# Patient Record
Sex: FEMALE | Race: WHITE | NOT HISPANIC OR LATINO | Employment: OTHER | URBAN - METROPOLITAN AREA
[De-identification: names, ages, dates, MRNs, and addresses within clinical notes are randomized per-mention and may not be internally consistent; named-entity substitution may affect disease eponyms.]

---

## 2017-01-05 DIAGNOSIS — I50.9 CONGESTIVE HEART FAILURE, UNSPECIFIED: ICD-10-CM

## 2017-01-06 RX ORDER — TORSEMIDE 20 MG/1
20 TABLET ORAL 2 TIMES DAILY
Qty: 60 TAB | Refills: 0 | Status: ON HOLD | OUTPATIENT
Start: 2017-01-06 | End: 2020-05-31

## 2017-01-19 ENCOUNTER — TELEPHONE (OUTPATIENT)
Dept: VASCULAR LAB | Facility: MEDICAL CENTER | Age: 82
End: 2017-01-19

## 2017-01-31 ENCOUNTER — TELEPHONE (OUTPATIENT)
Dept: VASCULAR LAB | Facility: MEDICAL CENTER | Age: 82
End: 2017-01-31

## 2017-01-31 ENCOUNTER — ANTICOAGULATION MONITORING (OUTPATIENT)
Dept: VASCULAR LAB | Facility: MEDICAL CENTER | Age: 82
End: 2017-01-31

## 2017-01-31 DIAGNOSIS — I48.91 ATRIAL FIBRILLATION, UNSPECIFIED TYPE (HCC): ICD-10-CM

## 2017-01-31 LAB — INR PPP: 1.9 (ref 2–3.5)

## 2017-02-01 NOTE — PROGRESS NOTES
Anticoagulation Summary as of 1/31/2017     INR goal 2.0-3.0   Selected INR 1.9! (1/31/2017)   Maintenance plan 1.25 mg (2.5 mg x 0.5) on Wed; 2.5 mg (2.5 mg x 1) all other days   Weekly total 16.25 mg   Plan last modified Jayson Sheth PHARMD (12/21/2016)   Next INR check 2/14/2017   Target end date Indefinite    Indications   Atrial fibrillation (CMS-HCC) [I48.91]         Anticoagulation Episode Summary     INR check location     Preferred lab     Send INR reminders to     Comments Contact her daughter Marilynn at [phone]  [facility]      Anticoagulation Care Providers     Provider Role Specialty Phone number    Ely Franks M.D. Referring Cardiology [phone]    Jayson Sheth PHARMD Responsible      [facility] Responsible  [phone]        Anticoagulation Patient Findings    Spoke with patient today regarding subtherapeutic INR of 1.9.  Patient denies any signs/symptoms of bruising or bleeding or any changes in diet and medications.  Instructed patient to call clinic with any questions or concerns.  Instructed patient to bolus with 3.75mg X 1, then resume current warfarin regimen.  Follow up in 2 weeks.    Jayson Sheth PHARMD

## 2017-02-14 LAB — INR PPP: 1.9 (ref 2–3.5)

## 2017-02-15 ENCOUNTER — ANTICOAGULATION MONITORING (OUTPATIENT)
Dept: VASCULAR LAB | Facility: MEDICAL CENTER | Age: 82
End: 2017-02-15

## 2017-02-15 DIAGNOSIS — I48.91 ATRIAL FIBRILLATION, UNSPECIFIED TYPE (HCC): ICD-10-CM

## 2017-02-16 NOTE — PROGRESS NOTES
Anticoagulation Summary as of 2/15/2017     INR goal 2.0-3.0   Selected INR 1.9! (2/14/2017)   Maintenance plan 2.5 mg (2.5 mg x 1) every day   Weekly total 17.5 mg   Plan last modified Jayson Sheth PHARMD (2/15/2017)   Next INR check 2/28/2017   Target end date Indefinite    Indications   Atrial fibrillation (CMS-HCC) [I48.91]         Anticoagulation Episode Summary     INR check location     Preferred lab     Send INR reminders to     Comments Contact her daughter Marilynn at [phone]  [facility]      Anticoagulation Care Providers     Provider Role Specialty Phone number    Ely Franks M.D. Referring Cardiology [phone]    Jayson Sheth PHARMD Responsible      [facility] Responsible  [phone]        Anticoagulation Patient Findings    Spoke with patients daughter Marilynn today regarding subtherapeutic INR of 1.9.  Patient denies any signs/symptoms of bruising or bleeding or any changes in diet and medications.  Instructed patient to call clinic with any questions or concerns.  Instructed patient to increase weekly warfarin regimen by ~8% as detailed above.  Follow up in 2 weeks.    Jayson Sheth, MALINAD

## 2017-03-15 ENCOUNTER — TELEPHONE (OUTPATIENT)
Dept: VASCULAR LAB | Facility: MEDICAL CENTER | Age: 82
End: 2017-03-15

## 2017-03-17 ENCOUNTER — ANTICOAGULATION MONITORING (OUTPATIENT)
Dept: VASCULAR LAB | Facility: MEDICAL CENTER | Age: 82
End: 2017-03-17

## 2017-03-17 LAB — INR PPP: 2.2 (ref 2–3.5)

## 2017-03-17 NOTE — PROGRESS NOTES
Anticoagulation Summary as of 3/17/2017     INR goal 2.0-3.0   Selected INR 2.2 (3/16/2017)   Maintenance plan 2.5 mg (2.5 mg x 1) every day   Weekly total 17.5 mg   Plan last modified Jayson Sheth, NAHUM (2/15/2017)   Next INR check 4/6/2017   Target end date Indefinite    Indications   Atrial fibrillation (CMS-HCC) [I48.91]         Anticoagulation Episode Summary     INR check location     Preferred lab     Send INR reminders to     Comments Contact her daughter Marilynn at [phone]  [facility]      Anticoagulation Care Providers     Provider Role Specialty Phone number    Laurierahel PREETI Franks Referring Cardiology [phone]    Jayson Sheth PHARMD Responsible      [facility] Anticoagulation Services Responsible  [phone]        Anticoagulation Patient Findings    Left voicemail message to report a therapeutic INR of 2.2.  Pt to continue with current warfarin dosing regimen. Requested pt contact the clinic for any s/s of unusual bleeding, bruising, clotting or any changes to diet or medication. FU 3 weeks.    César Schaeffer, PHARMD

## 2017-04-24 ENCOUNTER — TELEPHONE (OUTPATIENT)
Dept: VASCULAR LAB | Facility: MEDICAL CENTER | Age: 82
End: 2017-04-24

## 2017-05-17 ENCOUNTER — TELEPHONE (OUTPATIENT)
Dept: VASCULAR LAB | Facility: MEDICAL CENTER | Age: 82
End: 2017-05-17

## 2017-05-23 LAB — INR PPP: 2.6 (ref 2–3.5)

## 2017-05-24 ENCOUNTER — ANTICOAGULATION MONITORING (OUTPATIENT)
Dept: VASCULAR LAB | Facility: MEDICAL CENTER | Age: 82
End: 2017-05-24

## 2017-05-24 DIAGNOSIS — I48.91 ATRIAL FIBRILLATION, UNSPECIFIED TYPE (HCC): ICD-10-CM

## 2017-05-24 NOTE — PROGRESS NOTES
Anticoagulation Summary as of 5/24/2017     INR goal 2.0-3.0   Selected INR 2.6 (5/23/2017)   Maintenance plan 2.5 mg (2.5 mg x 1) every day   Weekly total 17.5 mg   Plan last modified Serenity Deras PHARMD (5/24/2017)   Next INR check 7/18/2017   Target end date Indefinite    Indications   Atrial fibrillation (CMS-HCC) [I48.91]         Anticoagulation Episode Summary     INR check location     Preferred lab     Send INR reminders to     Comments Contact her daughter Marilynn at [phone]  [facility]      Anticoagulation Care Providers     Provider Role Specialty Phone number    Laurierahel PREETI Franks Referring Cardiology [phone]    Jayson Sheth PHARMD Responsible      [facility]ticoagulation [facility] Responsible  [phone]        Anticoagulation Patient Findings    Spoke with Jacqueline to report a therapeutic INR of 2.6.Pt is to continue with current warfarin dosing regimen.  Pt denies any unusual s/s of bleeding, bruising, clotting or any changes to diet or medications.  Follow up in 8 weeks.    Serenity Deras, MALINAD

## 2017-08-02 ENCOUNTER — TELEPHONE (OUTPATIENT)
Dept: VASCULAR LAB | Facility: MEDICAL CENTER | Age: 82
End: 2017-08-02

## 2017-08-08 DIAGNOSIS — I48.20 CHRONIC ATRIAL FIBRILLATION (HCC): ICD-10-CM

## 2017-08-16 ENCOUNTER — ANTICOAGULATION MONITORING (OUTPATIENT)
Dept: VASCULAR LAB | Facility: MEDICAL CENTER | Age: 82
End: 2017-08-16

## 2017-08-16 DIAGNOSIS — I48.91 ATRIAL FIBRILLATION, UNSPECIFIED TYPE (HCC): ICD-10-CM

## 2017-08-16 NOTE — PROGRESS NOTES
Left message for pt to have INR checked  Updated SO faxed to [facility] lab.  Jayson Sheth, PHARMD

## 2017-08-17 ENCOUNTER — ANTICOAGULATION MONITORING (OUTPATIENT)
Dept: VASCULAR LAB | Facility: MEDICAL CENTER | Age: 82
End: 2017-08-17

## 2017-08-17 DIAGNOSIS — I48.91 ATRIAL FIBRILLATION, UNSPECIFIED TYPE (HCC): ICD-10-CM

## 2017-08-17 LAB
INR PPP: 3.2 (ref 2–3.5)
INR PPP: 3.2 (ref 2–3.5)

## 2017-08-17 NOTE — PROGRESS NOTES
Anticoagulation Summary as of 8/17/2017     INR goal 2.0-3.0   Selected INR 3.2! (8/17/2017)   Maintenance plan 2.5 mg (2.5 mg x 1) every day   Weekly total 17.5 mg   Plan last modified Serenity Deras, PHARMD (5/24/2017)   Next INR check 9/14/2017   Target end date Indefinite    Indications   Atrial fibrillation (CMS-HCC) [I48.91]         Anticoagulation Episode Summary     INR check location     Preferred lab     Send INR reminders to     Comments Contact her daughter Marilynn at [phone]  [facility]      Anticoagulation Care Providers     Provider Role Specialty Phone number    Laurierahel PREETI Franks Referring Cardiology [phone]    Jayson Sheth, MALINAD Responsible      [facility] Anticoagulation Services Responsible  [phone]        Anticoagulation Patient Findings  Spoke with pt and her daughter to confirm the following:    Patient's INR was therapeutic.   Denies any unusual s/s of bleeding, bruising, clotting.  Denies any changes to:   Diet   Medications  Denies alcohol or cranberry use.   Confirmed dosing regimen.    Pt is to reduce today then continue with current warfarin dosing regimen.    Follow up in 4 week(s) per pt preference.    César Schaeffer, PHARMD

## 2017-09-27 ENCOUNTER — TELEPHONE (OUTPATIENT)
Dept: VASCULAR LAB | Facility: MEDICAL CENTER | Age: 82
End: 2017-09-27

## 2017-10-05 LAB — INR PPP: 3.7 (ref 2–3.5)

## 2017-10-06 ENCOUNTER — ANTICOAGULATION MONITORING (OUTPATIENT)
Dept: VASCULAR LAB | Facility: MEDICAL CENTER | Age: 82
End: 2017-10-06

## 2017-10-06 NOTE — PROGRESS NOTES
Anticoagulation Summary  As of 10/6/2017    INR goal:   2.0-3.0   TTR:   42.5 % (1.4 y)   Today's INR:   3.7! (10/5/2017)   Maintenance plan:   1.25 mg (2.5 mg x 0.5) on Mon; 2.5 mg (2.5 mg x 1) all other days   Weekly total:   16.25 mg   Plan last modified:   César Schaeffer PharmD (10/6/2017)   Next INR check:   10/20/2017   Target end date:   Indefinite    Indications    Atrial fibrillation (CMS-HCC) [I48.91]             Anticoagulation Episode Summary     INR check location:       Preferred lab:       Send INR reminders to:       Comments:   Contact her daughter Marilynn at [phone]  [facility]      Anticoagulation Care Providers     Provider Role Specialty Phone number    Ely Franks M.D. Referring Cardiology [phone]    Jayson Sheth PharmD Responsible      [facility] Responsible  [phone]        Anticoagulation Patient Findings      HPI:  Jacqueline Cameron, on anticoagulation therapy with warfarin for afib.  Changes to current medical/health status since last appt: None  Denies signs/symptoms of bleeding and/or thrombosis since the last appt.    Denies any interval changes to diet  Denies any interval changes to medications since last appt.   Denies any complications or cost restrictions with current therapy.     A/P   INR  SUPRA-therapeutic.   Reduce today and begin 7% reduced regimen.     Next INR in 2 week(s).    César Schaeffer, DorisD

## 2017-10-11 DIAGNOSIS — I48.91 ATRIAL FIBRILLATION, UNSPECIFIED TYPE (HCC): ICD-10-CM

## 2017-11-08 ENCOUNTER — TELEPHONE (OUTPATIENT)
Dept: VASCULAR LAB | Facility: MEDICAL CENTER | Age: 82
End: 2017-11-08

## 2017-11-09 ENCOUNTER — ANTICOAGULATION MONITORING (OUTPATIENT)
Dept: VASCULAR LAB | Facility: MEDICAL CENTER | Age: 82
End: 2017-11-09

## 2017-11-09 DIAGNOSIS — I48.91 ATRIAL FIBRILLATION, UNSPECIFIED TYPE (HCC): ICD-10-CM

## 2017-11-09 LAB — INR PPP: 2.6 (ref 2–3.5)

## 2017-11-09 NOTE — PROGRESS NOTES
Anticoagulation Summary  As of 11/9/2017    INR goal:   2.0-3.0   TTR:   42.1 % (1.5 y)   Today's INR:   2.6   Maintenance plan:   1.25 mg (2.5 mg x 0.5) on Mon; 2.5 mg (2.5 mg x 1) all other days   Weekly total:   16.25 mg   Plan last modified:   César Schaeffer PharmD (10/6/2017)   Next INR check:   12/6/2017   Target end date:   Indefinite    Indications    Atrial fibrillation (CMS-HCC) [I48.91]             Anticoagulation Episode Summary     INR check location:       Preferred lab:       Send INR reminders to:       Comments:   Contact her daughter Marilynn at [phone]  [facility]      Anticoagulation Care Providers     Provider Role Specialty Phone number    Ely Franks M.D. Referring Cardiology [phone]    Jayson Sheth PharmD Responsible      [facility] Anticoagulation Services Responsible  [phone]        Anticoagulation Patient Findings    See note by Shalini Douglass 04/18/2016    Serenity Deras PharmD

## 2017-12-20 ENCOUNTER — TELEPHONE (OUTPATIENT)
Dept: VASCULAR LAB | Facility: MEDICAL CENTER | Age: 82
End: 2017-12-20

## 2018-01-03 ENCOUNTER — ANTICOAGULATION MONITORING (OUTPATIENT)
Dept: VASCULAR LAB | Facility: MEDICAL CENTER | Age: 83
End: 2018-01-03

## 2018-01-03 DIAGNOSIS — I48.91 ATRIAL FIBRILLATION, UNSPECIFIED TYPE (HCC): ICD-10-CM

## 2018-01-03 LAB — INR PPP: 3.2 (ref 2–3.5)

## 2018-01-04 NOTE — PROGRESS NOTES
Anticoagulation Summary  As of 1/3/2018    INR goal:   2.0-3.0   TTR:   44.4 % (1.7 y)   Today's INR:   3.2!   Maintenance plan:   1.25 mg (2.5 mg x 0.5) on Mon; 2.5 mg (2.5 mg x 1) all other days   Weekly total:   16.25 mg   Plan last modified:   César Schaeffer, PharmD (10/6/2017)   Next INR check:   1/17/2018   Target end date:   Indefinite    Indications    Atrial fibrillation (CMS-HCC) [I48.91]             Anticoagulation Episode Summary     INR check location:       Preferred lab:       Send INR reminders to:       Comments:   Contact her daughter Marilynn at [phone]  [facility]      Anticoagulation Care Providers     Provider Role Specialty Phone number    Ely Franks M.D. Referring Cardiology [phone]    Jayson Sheth, PharmD Responsible      [facility] Responsible  [phone]        Anticoagulation Patient Findings      Spoke with pt's daughter.  INR is SUPRA therapeutic.   Pt has been having on going dental issues, so hasn't been able to eat much.  Pt denies any unusual s/s of bleeding, bruising, clotting or any changes to diet or medications. Denies any etoh, cranberries, supplements, or illness.   Pt verifies warfarin weekly dosing.       Will have pt start a 7% decreased weekly dose.     Repeat INR in 2 weeks.     Tanya Mares, PharmD

## 2018-01-13 DIAGNOSIS — I48.91 ATRIAL FIBRILLATION, UNSPECIFIED TYPE (HCC): ICD-10-CM

## 2018-01-13 LAB — EKG IMPRESSION: NORMAL

## 2018-02-08 ENCOUNTER — ANTICOAGULATION MONITORING (OUTPATIENT)
Dept: VASCULAR LAB | Facility: MEDICAL CENTER | Age: 83
End: 2018-02-08

## 2018-02-08 LAB — INR PPP: 9.84 (ref 2–3.5)

## 2018-02-08 NOTE — PROGRESS NOTES
Anticoagulation Summary  As of 2/8/2018    INR goal:   2.0-3.0   TTR:   41.9 % (1.8 y)   Today's INR:   9.84!   Maintenance plan:   1.25 mg (2.5 mg x 0.5) on Mon, Thu; 2.5 mg (2.5 mg x 1) all other days   Weekly total:   15 mg   Plan last modified:   Tanya Mares, PharmD (1/3/2018)   Next INR check:   2/12/2018   Target end date:   Indefinite    Indications    Atrial fibrillation (CMS-HCC) [I48.91]             Anticoagulation Episode Summary     INR check location:       Preferred lab:       Send INR reminders to:       Comments:   Contact her daughter Marilynn at [phone]  [facility]      Anticoagulation Care Providers     Provider Role Specialty Phone number    Ely Franks M.D. Referring Cardiology [phone]    Jayson Sheth, PharmD Responsible      [facility] Anticoagulation [facility] Responsible  [phone]        Anticoagulation Patient Findings    Left VM with pt and with her daughter instructing her to holds warfarin until 2/12/18.  Pt to have INR checked again on 2/12/18.  Instructed pt to seek medical help for any bleeding.    César Schaeffer, PharmD

## 2018-02-12 LAB
INR PPP: 2.5 (ref 2–3.5)
INR PPP: 2.5 (ref 2–3.5)

## 2018-02-13 ENCOUNTER — ANTICOAGULATION MONITORING (OUTPATIENT)
Dept: VASCULAR LAB | Facility: MEDICAL CENTER | Age: 83
End: 2018-02-13

## 2018-02-13 DIAGNOSIS — I48.91 ATRIAL FIBRILLATION, UNSPECIFIED TYPE (HCC): ICD-10-CM

## 2018-02-13 NOTE — PROGRESS NOTES
OP Telephone Anticoagulation Service Note    Date: 2/13/2018      Anticoagulation Summary  As of 2/13/2018    INR goal:   2.0-3.0   TTR:   41.7 % (1.8 y)   Today's INR:   2.5 (2/12/2018)   Maintenance plan:   1.25 mg (2.5 mg x 0.5) on Mon, Thu; 2.5 mg (2.5 mg x 1) all other days   Weekly total:   15 mg   Plan last modified:   Tanya Mares, PharmD (1/3/2018)   Next INR check:   2/19/2018   Target end date:   Indefinite    Indications    Atrial fibrillation (CMS-HCC) [I48.91]             Anticoagulation Episode Summary     INR check location:       Preferred lab:       Send INR reminders to:       Comments:   Contact her daughter Marilynn at [phone]  [facility]      Anticoagulation Care Providers     Provider Role Specialty Phone number    GeoBreeTrevinrahel PREETI Franks Referring Cardiology [phone]    Jasyon Sheth, PharmD Responsible      [facility] Anticoagulation Services Responsible  [phone]        Anticoagulation Patient Findings        Plan: INR is in range. Spoke with pts daughter on the phone. She report pt has been holding warfarin as instructed. She was on a course of diflucan and did not notify our clinic. She has been off diflucan for about 5 days now. Will have pt resume her usual warfarin dosing regimen. Follow up 1 week.     Mela Sunshine, DorisD

## 2018-03-07 ENCOUNTER — TELEPHONE (OUTPATIENT)
Dept: VASCULAR LAB | Facility: MEDICAL CENTER | Age: 83
End: 2018-03-07

## 2018-03-08 ENCOUNTER — ANTICOAGULATION MONITORING (OUTPATIENT)
Dept: VASCULAR LAB | Facility: MEDICAL CENTER | Age: 83
End: 2018-03-08

## 2018-03-08 LAB — INR PPP: 3.6 (ref 2–3.5)

## 2018-03-09 NOTE — PROGRESS NOTES
Anticoagulation Summary  As of 3/8/2018    INR goal:   2.0-3.0   TTR:   41.8 % (1.8 y)   Today's INR:   3.6!   Maintenance plan:   1.25 mg (2.5 mg x 0.5) on Mon, Wed, Fri; 2.5 mg (2.5 mg x 1) all other days   Weekly total:   13.75 mg   Plan last modified:   César Schaeffer PharmD (3/8/2018)   Next INR check:   3/22/2018   Target end date:   Indefinite    Indications    Atrial fibrillation (CMS-HCC) [I48.91]             Anticoagulation Episode Summary     INR check location:       Preferred lab:       Send INR reminders to:       Comments:   Contact her daughter Marilynn at [phone]  [facility]      Anticoagulation Care Providers     Provider Role Specialty Phone number    Ely Franks M.D. Referring Cardiology [phone]    Doris AmadorD Responsible      [facility] Anticoagulation Services Responsible  [phone]        Anticoagulation Patient Findings    Left voicemail message to report a SUPRA therapeutic INR of 3.6.  Pt to begin reduced warfarin dosing regimen. Requested pt contact the clinic for any s/s of unusual bleeding, bruising, clotting or any changes to diet or medication. FU 2 weeks.    César Schaeffer, DorisD

## 2018-04-04 ENCOUNTER — TELEPHONE (OUTPATIENT)
Dept: VASCULAR LAB | Facility: MEDICAL CENTER | Age: 83
End: 2018-04-04

## 2018-04-10 DIAGNOSIS — R07.9 CHEST PAIN, UNSPECIFIED TYPE: ICD-10-CM

## 2018-04-17 ENCOUNTER — ANTICOAGULATION MONITORING (OUTPATIENT)
Dept: VASCULAR LAB | Facility: MEDICAL CENTER | Age: 83
End: 2018-04-17

## 2018-04-17 LAB — INR PPP: 3.4 (ref 2–3.5)

## 2018-04-17 NOTE — PROGRESS NOTES
Anticoagulation Summary  As of 4/17/2018    INR goal:   2.0-3.0   TTR:   39.5 % (2 y)   Today's INR:   3.4!   Maintenance plan:   1.25 mg (2.5 mg x 0.5) on Mon, Wed, Fri; 2.5 mg (2.5 mg x 1) all other days   Weekly total:   13.75 mg   Plan last modified:   César Schaeffer, PharmD (3/8/2018)   Next INR check:   4/20/2018   Target end date:   Indefinite    Indications    Atrial fibrillation (CMS-HCC) [I48.91]             Anticoagulation Episode Summary     INR check location:       Preferred lab:       Send INR reminders to:       Comments:   Contact her daughter Marilynn at [phone]  [facility]      Anticoagulation Care Providers     Provider Role Specialty Phone number    Ely Franks M.D. Referring Cardiology [phone]    Jayson Sheth, PharmD Responsible      [facility] Responsible  [phone]        Anticoagulation Patient Findings    HPI:  Jacqueline Cameron, on anticoagulation therapy with warfarin for AF.  Pt recently hospitalized for cellulitis. Pt started Bactrim.   Denies signs/symptoms of bleeding and/or thrombosis since the last appt.    Denies any interval changes to diet  Denies any complications or cost restrictions with current therapy.     A/P   INR  SUPRA-therapeutic.   Begin reduced regimen given DDI.     Next INR in 3 days.     César Schaeffer, PharmD

## 2018-04-19 LAB — EKG IMPRESSION: NORMAL

## 2018-04-26 ENCOUNTER — ANTICOAGULATION MONITORING (OUTPATIENT)
Dept: VASCULAR LAB | Facility: MEDICAL CENTER | Age: 83
End: 2018-04-26

## 2018-04-26 DIAGNOSIS — I48.0 PAROXYSMAL ATRIAL FIBRILLATION (HCC): ICD-10-CM

## 2018-04-26 LAB — INR PPP: 2.2 (ref 2–3.5)

## 2018-04-27 NOTE — PROGRESS NOTES
Anticoagulation Summary  As of 4/26/2018    INR goal:   2.0-3.0   TTR:   39.8 % (2 y)   Today's INR:   2.2   Warfarin maintenance plan:   1.25 mg (2.5 mg x 0.5) on Mon, Wed, Fri; 2.5 mg (2.5 mg x 1) all other days   Weekly warfarin total:   13.75 mg   Plan last modified:   Sandro Vazquez PharmD (4/26/2018)   Next INR check:   5/10/2018   Target end date:   Indefinite    Indications    Atrial fibrillation (CMS-HCC) [I48.91]             Anticoagulation Episode Summary     INR check location:       Preferred lab:       Send INR reminders to:       Comments:   Contact her daughter Marilynn at [phone]  [facility]      Anticoagulation Care Providers     Provider Role Specialty Phone number    Ely Franks M.D. Referring Cardiology [phone]    Jayson Sheth PharmD Responsible      [facility] Responsible  [phone]        Anticoagulation Patient Findings    Spoke to patient on the phone.   INR therapeutic.   Denies signs/symptoms of bleeding and/or thrombosis.   Denies changes to diet or medications.   Follow up appointment in 2 week(s).    Continue weekly warfarin dose as noted      Sandro Vazquez, Shane

## 2018-05-01 ENCOUNTER — ANTICOAGULATION MONITORING (OUTPATIENT)
Dept: VASCULAR LAB | Facility: MEDICAL CENTER | Age: 83
End: 2018-05-01

## 2018-05-01 DIAGNOSIS — I48.91 ATRIAL FIBRILLATION, UNSPECIFIED TYPE (HCC): ICD-10-CM

## 2018-05-01 LAB — INR PPP: 1.7 (ref 2–3.5)

## 2018-05-01 NOTE — PROGRESS NOTES
Anticoagulation Summary  As of 5/1/2018    INR goal:   2.0-3.0   TTR:   39.8 % (2 y)   Today's INR:   1.7!   Warfarin maintenance plan:   1.25 mg (2.5 mg x 0.5) on Mon, Wed, Fri; 2.5 mg (2.5 mg x 1) all other days   Weekly warfarin total:   13.75 mg   Plan last modified:   Sandro Vazquez PharmD (4/26/2018)   Next INR check:   5/8/2018   Target end date:   Indefinite    Indications    Atrial fibrillation (HCC) [I48.91]             Anticoagulation Episode Summary     INR check location:       Preferred lab:       Send INR reminders to:       Comments:   Contact her daughter Marilynn at [phone]  [facility]      Anticoagulation Care Providers     Provider Role Specialty Phone number    Ely Franks M.D. Referring Cardiology [phone]    Doris AmadorD Responsible      [facility] Anticoagulation Services Responsible  [phone]        Anticoagulation Patient Findings    Spoke with Randee, to report a sub therapeutic INR of 1.7.  Will bolus dose with 3.75mg tonight, then resume current dosing regimen. Pt denies any unusual s/s of bleeding, bruising, clotting or any changes to diet or medications.  Follow up in 1 weeks, to reduce risk of adverse events related to this high risk medication,  Warfarin.    Serenity Deras, PharmD

## 2018-05-02 ENCOUNTER — ANTICOAGULATION MONITORING (OUTPATIENT)
Dept: VASCULAR LAB | Facility: MEDICAL CENTER | Age: 83
End: 2018-05-02

## 2018-05-02 DIAGNOSIS — I48.91 ATRIAL FIBRILLATION, UNSPECIFIED TYPE (HCC): ICD-10-CM

## 2018-05-10 ENCOUNTER — ANTICOAGULATION MONITORING (OUTPATIENT)
Dept: VASCULAR LAB | Facility: MEDICAL CENTER | Age: 83
End: 2018-05-10

## 2018-05-10 LAB — INR PPP: 2.1 (ref 2–3.5)

## 2018-05-10 NOTE — PROGRESS NOTES
Anticoagulation Summary  As of 5/10/2018    INR goal:   2.0-3.0   TTR:   39.6 % (2 y)   Today's INR:   2.1   Warfarin maintenance plan:   1.25 mg (2.5 mg x 0.5) on Mon, Wed, Fri; 2.5 mg (2.5 mg x 1) all other days   Weekly warfarin total:   13.75 mg   Plan last modified:   Sandro Vazquez, PharmD (4/26/2018)   Next INR check:   5/17/2018   Target end date:   Indefinite    Indications    Atrial fibrillation (HCC) [I48.91]             Anticoagulation Episode Summary     INR check location:       Preferred lab:       Send INR reminders to:       Comments:   Contact her daughter Marilynn at [phone]  [facility]  [place] - fax - [phone]      Anticoagulation Care Providers     Provider Role Specialty Phone number    GeoBreeTrevinrahel PREETI Franks Referring Cardiology [phone]    Jayson Sheth, PharmD Responsible      [facility] Anticoagulation Services Responsible  [phone]        Anticoagulation Patient Findings    Spoke with patient's daughter to report a therapeutic INR.    Pt instructed to continue with current warfarin dosing regimen, confirms dosing.   Pt denies any s/s of bleeding, bruising, clotting or any changes to diet or medication.    Will follow up in 1 week(s) with Burt MONIKA.     Tanya Mares, PharmD

## 2018-05-11 ENCOUNTER — ANTICOAGULATION MONITORING (OUTPATIENT)
Dept: VASCULAR LAB | Facility: MEDICAL CENTER | Age: 83
End: 2018-05-11

## 2018-05-17 ENCOUNTER — ANTICOAGULATION MONITORING (OUTPATIENT)
Dept: VASCULAR LAB | Facility: MEDICAL CENTER | Age: 83
End: 2018-05-17

## 2018-05-17 LAB — INR PPP: 2.3 (ref 2–3.5)

## 2018-05-17 NOTE — PROGRESS NOTES
Anticoagulation Summary  As of 5/17/2018    INR goal:   2.0-3.0   TTR:   40.2 % (2 y)   Today's INR:   2.3   Warfarin maintenance plan:   1.25 mg (2.5 mg x 0.5) on Mon, Wed, Fri; 2.5 mg (2.5 mg x 1) all other days   Weekly warfarin total:   13.75 mg   Plan last modified:   Sandro Vazquez, PharmD (4/26/2018)   Next INR check:   5/24/2018   Target end date:   Indefinite    Indications    Atrial fibrillation (HCC) [I48.91]             Anticoagulation Episode Summary     INR check location:       Preferred lab:       Send INR reminders to:       Comments:   Contact her daughter Marilynn at [phone]  [facility]  [place] - fax - [phone]      Anticoagulation Care Providers     Provider Role Specialty Phone number    MatthieuDugregoryrahel PREETI Franks Referring Cardiology [phone]    Jayson Sheth, PharmD Responsible      [facility] Anticoagulation Services Responsible  [phone]        Anticoagulation Patient Findings      Left voicemail message to report a therapeutic INR of 2.3.     Pt to continue with current warfarin dosing regimen. Requested pt contact the clinic for any s/s of unusual bleeding, bruising, clotting or any changes to diet or medication.    FU INR in 1 week(s) with Burt MONIKA.    Tanya Mares, PharmD

## 2018-05-29 ENCOUNTER — ANTICOAGULATION MONITORING (OUTPATIENT)
Dept: VASCULAR LAB | Facility: MEDICAL CENTER | Age: 83
End: 2018-05-29

## 2018-05-29 DIAGNOSIS — I48.91 ATRIAL FIBRILLATION, UNSPECIFIED TYPE (HCC): ICD-10-CM

## 2018-05-29 LAB — INR PPP: 1.6 (ref 2–3.5)

## 2018-05-29 NOTE — PROGRESS NOTES
OP Telephone Anticoagulation Service Note    Date: 5/29/2018      Anticoagulation Summary  As of 5/29/2018    INR goal:   2.0-3.0   TTR:   40.2 % (2.1 y)   Today's INR:   1.6!   Warfarin maintenance plan:   1.25 mg (2.5 mg x 0.5) on Mon, Wed, Fri; 2.5 mg (2.5 mg x 1) all other days   Weekly warfarin total:   13.75 mg   Plan last modified:   Doris MarceloD (4/26/2018)   Next INR check:   6/5/2018   Target end date:   Indefinite    Indications    Atrial fibrillation (HCC) [I48.91]             Anticoagulation Episode Summary     INR check location:       Preferred lab:       Send INR reminders to:       Comments:   Contact her daughter Marilynn at [phone]  [facility] - fax - [phone]      Anticoagulation Care Providers     Provider Role Specialty Phone number    MattiheuTrevinrahel PREETI Franks Referring Cardiology [phone]    Doris AmadorD Responsible      [facility] Anticoagulation Services Responsible  [phone]        Anticoagulation Patient Findings        Plan: INR subtherapeutic. Left message on patient's answering machine/voicemail. Instructed patient to call back with any concerns regarding any unusual bleeding or bruising, any medication or diet changes or any signs or symptoms of thrombosis. Instructed patient to take 1.5 tablets tonight then resume medication as outlined above. Patient to follow up in 1 week(s).       Mela Sunshine, DorisD

## 2018-06-18 ENCOUNTER — TELEPHONE (OUTPATIENT)
Dept: VASCULAR LAB | Facility: MEDICAL CENTER | Age: 83
End: 2018-06-18

## 2018-06-18 DIAGNOSIS — I48.91 ATRIAL FIBRILLATION, UNSPECIFIED TYPE (HCC): ICD-10-CM

## 2018-07-18 ENCOUNTER — TELEPHONE (OUTPATIENT)
Dept: VASCULAR LAB | Facility: MEDICAL CENTER | Age: 83
End: 2018-07-18

## 2018-07-18 DIAGNOSIS — I48.91 ATRIAL FIBRILLATION, UNSPECIFIED TYPE (HCC): ICD-10-CM

## 2018-07-18 NOTE — TELEPHONE ENCOUNTER
Received a fax from [facility] stating that the patient is no longer in service with them

## 2018-07-18 NOTE — TELEPHONE ENCOUNTER
Left message for patient's daughter to have INR checked, not sure if still being visited by [facility].  Jayson Sheth, PharmD

## 2018-07-19 ENCOUNTER — ANTICOAGULATION MONITORING (OUTPATIENT)
Dept: VASCULAR LAB | Facility: MEDICAL CENTER | Age: 83
End: 2018-07-19

## 2018-07-19 LAB — INR PPP: 2.5 (ref 2–3.5)

## 2018-07-19 NOTE — PROGRESS NOTES
OP Anticoagulation Service Note    Date: 7/19/2018  Anticoagulation Summary  As of 7/19/2018    INR goal:   2.0-3.0   TTR:   41.2 % (2.2 y)   Today's INR:   2.5   Warfarin maintenance plan:   1.25 mg (2.5 mg x 0.5) on Mon, Wed, Fri; 2.5 mg (2.5 mg x 1) all other days   Weekly warfarin total:   13.75 mg   No change documented:   Shine Caro Ass't   Plan last modified:   Sandro Vazquez PharmD (4/26/2018)   Next INR check:   8/16/2018   Target end date:   Indefinite    Indications    Atrial fibrillation (HCC) [I48.91]             Anticoagulation Episode Summary     INR check location:       Preferred lab:       Send INR reminders to:       Comments:   Contact her daughter Marilynn at [phone]  [facility] - fax - [phone]      Anticoagulation Care Providers     Provider Role Specialty Phone number    Ely Franks M.D. Referring Cardiology [phone]    Doris AmadorD Responsible      [facility] Anticoagulation Services Responsible  [phone]        Anticoagulation Patient Findings  Patient Findings     Negatives:   Signs/symptoms of thrombosis, Signs/symptoms of bleeding, Laboratory test error suspected, Change in health, Change in alcohol use, Change in activity, Upcoming invasive procedure, Emergency department visit, Upcoming dental procedure, Missed doses, Extra doses, Change in medications, Change in diet/appetite, Hospital admission, Bruising, Other complaints        Plan: unable to reach patient or LM. VM is full. Will call back at a later time.    Shine Caro. Ass't  [facility]    I have reviewed and concur with the above plan     Sandro Vazquez, DorisD

## 2018-08-28 ENCOUNTER — TELEPHONE (OUTPATIENT)
Dept: VASCULAR LAB | Facility: MEDICAL CENTER | Age: 83
End: 2018-08-28

## 2018-08-28 NOTE — TELEPHONE ENCOUNTER
Attempted to reach patient's daughter regarding need for INR, no answer, no VM.  Will try back at a later time.  Jayson Sheth, PharmD

## 2018-10-10 ENCOUNTER — ANTICOAGULATION MONITORING (OUTPATIENT)
Dept: VASCULAR LAB | Facility: MEDICAL CENTER | Age: 83
End: 2018-10-10

## 2018-10-10 DIAGNOSIS — I48.91 ATRIAL FIBRILLATION, UNSPECIFIED TYPE (HCC): ICD-10-CM

## 2018-10-10 LAB — INR PPP: 2.4 (ref 2–3.5)

## 2018-10-10 NOTE — PROGRESS NOTES
Anticoagulation Summary  As of 10/10/2018    INR goal:   2.0-3.0   TTR:   46.7 % (2.4 y)   Today's INR:   2.4   Warfarin maintenance plan:   1.25 mg (2.5 mg x 0.5) on Mon, Wed, Fri; 2.5 mg (2.5 mg x 1) all other days   Weekly warfarin total:   13.75 mg   Plan last modified:   Sandro Vazquez, PharmD (4/26/2018)   Next INR check:   11/28/2018   Target end date:   Indefinite    Indications    Atrial fibrillation (HCC) [I48.91]             Anticoagulation Episode Summary     INR check location:       Preferred lab:       Send INR reminders to:       Comments:   Contact her daughter Marilynn at [phone]  [facility] - fax - [phone]      Anticoagulation Care Providers     Provider Role Specialty Phone number    Ely Franks M.D. Referring Cardiology [phone]    Jayson Sheth, PharmD Responsible      [facility] Anticoagulation Services Responsible  [phone]        Anticoagulation Patient Findings    History of Present Illness: follow up appointment for chronic anticoagulation with the high risk medication, warfarin for atrial fibrillation    Pt remains therapeutic today. Continue current dosing regimen.  Follow up in 8 weeks, to reduce the risk of adverse events related to this high risk medication, warfarin.    Serenity Deras, Clinical Pharmacist

## 2018-12-10 ENCOUNTER — ANTICOAGULATION MONITORING (OUTPATIENT)
Dept: VASCULAR LAB | Facility: MEDICAL CENTER | Age: 83
End: 2018-12-10

## 2018-12-10 DIAGNOSIS — I48.91 ATRIAL FIBRILLATION, UNSPECIFIED TYPE (HCC): ICD-10-CM

## 2018-12-10 LAB — INR PPP: 2.93 (ref 2–3.5)

## 2018-12-11 NOTE — PROGRESS NOTES
Pt's daughter called, [employer] pharmacy student spoke with her regarding bruising on arm.   Advised to monitor it and if worsening or overly concerned to go get it assessed by MD.   INR therapeutic today.    Tanya Mares, PharmD

## 2018-12-11 NOTE — PROGRESS NOTES
OP Anticoagulation Service Note    Date: 12/10/2018  .  Anticoagulation Summary  As of 12/10/2018    INR goal:   2.0-3.0   TTR:   50.2 % (2.6 y)   Today's INR:   2.93   Warfarin maintenance plan:   1.25 mg (2.5 mg x 0.5) on Mon, Wed, Fri; 2.5 mg (2.5 mg x 1) all other days   Weekly warfarin total:   13.75 mg   No change documented:   Shine Caro Ass't   Plan last modified:   Sandro Vazquez, PharmD (4/26/2018)   Next INR check:   1/21/2019   Target end date:   Indefinite    Indications    Atrial fibrillation (HCC) [I48.91]             Anticoagulation Episode Summary     INR check location:       Preferred lab:       Send INR reminders to:       Comments:   Contact her daughter Marilynn at [phone]  [facility] - fax - [phone]      Anticoagulation Care Providers     Provider Role Specialty Phone number    Laurierahel PREETI Franks Referring Cardiology [phone]    Doris AmadorD Responsible      [facility] Anticoagulation Services Responsible  [phone]        Anticoagulation Patient Findings  Patient Findings     Negatives:   Signs/symptoms of thrombosis, Signs/symptoms of bleeding, Laboratory test error suspected, Change in health, Change in alcohol use, Change in activity, Upcoming invasive procedure, Emergency department visit, Upcoming dental procedure, Missed doses, Extra doses, Change in medications, Change in diet/appetite, Hospital admission, Bruising, Other complaints        Plan: Left patient a message. Patient is therapeutic and will remain on the same dose. Patient was instructed to call back if needed to report any unusual bleeding or bruising or any changes to medication or diet. Patient is to be checked again in 6 weeks.    Shine Caro. Ass't  [facility]    I have reviewed and concur with the above plan     Sandro Vazquez, PharmD    I faxed this note to home health as noted above.

## 2019-02-05 ENCOUNTER — TELEPHONE (OUTPATIENT)
Dept: VASCULAR LAB | Facility: MEDICAL CENTER | Age: 84
End: 2019-02-05

## 2019-02-21 ENCOUNTER — ANTICOAGULATION MONITORING (OUTPATIENT)
Dept: VASCULAR LAB | Facility: MEDICAL CENTER | Age: 84
End: 2019-02-21

## 2019-02-21 DIAGNOSIS — I48.91 ATRIAL FIBRILLATION, UNSPECIFIED TYPE (HCC): ICD-10-CM

## 2019-02-21 LAB — INR PPP: 1.92 (ref 2–3.5)

## 2019-02-21 NOTE — PROGRESS NOTES
Anticoagulation Summary  As of 2019    INR goal:   2.0-3.0   TTR:   53.2 % (2.8 y)   INR used for dosin.92! (2019)   Warfarin maintenance plan:   1.25 mg (2.5 mg x 0.5) every Mon, Wed, Fri; 2.5 mg (2.5 mg x 1) all other days   Weekly warfarin total:   13.75 mg   Plan last modified:   Doris MarceloD (2018)   Next INR check:   3/21/2019   Target end date:   Indefinite    Indications    Atrial fibrillation (HCC) [I48.91]             Anticoagulation Episode Summary     INR check location:       Preferred lab:       Send INR reminders to:       Comments:   Contact her daughter Marilynn at [phone]  [facility] - fax - [phone]      Anticoagulation Care Providers     Provider Role Specialty Phone number    Ely Franks M.D. Referring Cardiology [phone]    Jayson Sheth, Shane Responsible      [facility] Anticoagulation Services Responsible  [phone]        Anticoagulation Patient Findings    Left voicemail message to report a subtherapeutic INR of 1.92.  Requested pt contact the clinic for any s/s of unusual bleeding, bruising, clotting or any changes to diet or medication.   Instructed patient to bolus 3.75mg X 1, then resume current warfarin regimen.  FU 4 weeks.  Jayson Sheth, DorisD

## 2019-04-09 ENCOUNTER — TELEPHONE (OUTPATIENT)
Dept: VASCULAR LAB | Facility: MEDICAL CENTER | Age: 84
End: 2019-04-09

## 2019-06-04 ENCOUNTER — TELEPHONE (OUTPATIENT)
Dept: VASCULAR LAB | Facility: MEDICAL CENTER | Age: 84
End: 2019-06-04

## 2019-07-19 ENCOUNTER — TELEPHONE (OUTPATIENT)
Dept: VASCULAR LAB | Facility: MEDICAL CENTER | Age: 84
End: 2019-07-19

## 2019-07-19 NOTE — LETTER
July 19, 2019    Jacqueline Cameron  [address]  [place] [address]      Dear Jacqueline Cameron  We have been unsuccessful in our attempts to contact you regarding your Anticoagulation Service appointments.  Warfarin is a potent blood-thinning agent that requires frequent monitoring to measure its level in the blood.  If your level becomes too high, you could develop serious, sometimes life-threatening bleeding problems.  If your level becomes too low, life-threatening blood clots or stroke could result.     The last recorded INR that we have on file for you is:  INR   Date Value Ref Range Status   02/21/2019 1.92  Final     It is extremely important that you have your lab work drawn as soon as possible.  Alternatively, you may schedule an appointment for a fingerstick INR at our clinic.  We are open Monday-Friday 8 am until 5 pm.  You may reach our Service at [phone].     To monitor your warfarin level effectively, we need to be able to communicate with you.  This is a requirement to be followed by our Service.  If we are unable to contact you on three separate occasions, you will be discharged from the Anticoagulation Service.     If you repeatedly fail to keep your lab appointments, you are at risk of being discharged from the Service.           Sincerely,           Deep Verde, PharmD, [facility]  Pharmacy Clinical Supervisor  [facility]  Outpatient Anticoagulation Service

## 2019-07-29 ENCOUNTER — ANTICOAGULATION MONITORING (OUTPATIENT)
Dept: MEDICAL GROUP | Facility: MEDICAL CENTER | Age: 84
End: 2019-07-29

## 2019-07-29 DIAGNOSIS — I48.91 ATRIAL FIBRILLATION, UNSPECIFIED TYPE (HCC): ICD-10-CM

## 2019-07-29 LAB — INR PPP: 2.77 (ref 2–3.5)

## 2019-07-29 NOTE — PROGRESS NOTES
OP Telephone Anticoagulation Service Note    Date: 2019      Anticoagulation Summary  As of 2019    INR goal:   2.0-3.0   TTR:   58.2 % (3.2 y)   INR used for dosin.77 (2019)   Warfarin maintenance plan:   1.5 mg (3 mg x 0.5) every Mon, Wed, Fri; 3 mg (3 mg x 1) all other days   Weekly warfarin total:   16.5 mg   Plan last modified:   Franc Sampson, Pharmacy Intern (2019)   Next INR check:   2019   Target end date:   Indefinite    Indications    Atrial fibrillation (HCC) [I48.91]             Anticoagulation Episode Summary     INR check location:       Preferred lab:       Send INR reminders to:       Comments:   Contact her daughter Marilynn at [phone]  [facility] - fax - [phone]      Anticoagulation Care Providers     Provider Role Specialty Phone number    Ely PREETI Franks Referring Cardiology [phone]    Jayson Sheth, PharmD Responsible      [facility] Anticoagulation Services Responsible  [phone]        Anticoagulation Patient Findings        Plan: Spoke with patient's daughter Shawna on the phone. Patient is therapeutic today. On attempt to confirm dosing, patient's daughter states that patient was using 3 mg tablets instead of 2.5 mg tablets, but that dosing was otherwise unchanged concerning full vs. Half tablet days. Asked Shawna to confirm color of the tablets, she states they were a light-brown/taupe colored, patient chart was updated to reflect this.  No missed tablets in the last week. Patient denies any changes in medications or diet. Patient denies any signs or symptoms of bleeding or clotting. Instructed patient to call clinic if any unusual bleeding or bruising occurs. Will continue dosing as outlined. Will follow-up with patient in 4 week(s).      Franc Sampson, Pharmacy Intern

## 2019-09-17 ENCOUNTER — TELEPHONE (OUTPATIENT)
Dept: VASCULAR LAB | Facility: MEDICAL CENTER | Age: 84
End: 2019-09-17

## 2019-11-18 ENCOUNTER — TELEPHONE (OUTPATIENT)
Dept: VASCULAR LAB | Facility: MEDICAL CENTER | Age: 84
End: 2019-11-18

## 2019-12-11 ENCOUNTER — ANTICOAGULATION MONITORING (OUTPATIENT)
Dept: VASCULAR LAB | Facility: MEDICAL CENTER | Age: 84
End: 2019-12-11

## 2019-12-11 DIAGNOSIS — I48.91 ATRIAL FIBRILLATION, UNSPECIFIED TYPE (HCC): ICD-10-CM

## 2019-12-11 LAB — INR PPP: 4.4 (ref 2–3.5)

## 2019-12-11 NOTE — PROGRESS NOTES
Anticoagulation Summary  As of 2019    INR goal:   2.0-3.0   TTR:   53.7 % (3.6 y)   INR used for dosin.40! (2019)   Warfarin maintenance plan:   1.5 mg (3 mg x 0.5) every Mon, Wed, Fri; 3 mg (3 mg x 1) all other days   Weekly warfarin total:   16.5 mg   Plan last modified:   Franc Sampson, Pharmacy Intern (2019)   Next INR check:   2019   Target end date:   Indefinite    Indications    Atrial fibrillation (HCC) [I48.91]             Anticoagulation Episode Summary     INR check location:       Preferred lab:       Send INR reminders to:       Comments:   Contact her daughter Marilynn at [phone]  [facility] - fax - [phone]      Anticoagulation Care Providers     Provider Role Specialty Phone number    Ely Franks M.D. Referring Cardiology [phone]    Jayson Sheth, PharmD Responsible      [facility] Anticoagulation Services Responsible  [phone]        Anticoagulation Patient Findings     Left voicemail message to report a supratherapeutic INR of 4.4.  Requested pt contact the clinic for any s/s of unusual bleeding, bruising, clotting or any changes to diet or medication.   Instructed patient to HOLD X 1, decrease tomorrow to 1.5mg, then resume current warfarin regimen.  FU 5 days.  Jayson Sheth, PharmD, BCACP

## 2020-01-07 ENCOUNTER — TELEPHONE (OUTPATIENT)
Dept: VASCULAR LAB | Facility: MEDICAL CENTER | Age: 85
End: 2020-01-07

## 2020-01-21 ENCOUNTER — TELEPHONE (OUTPATIENT)
Dept: VASCULAR LAB | Facility: MEDICAL CENTER | Age: 85
End: 2020-01-21

## 2020-01-21 NOTE — LETTER
January 21, 2020          Jacqueline Cameron  [address]  [place] [address]        Dear Jacqueline Cameron ,    We have been unsuccessful in our attempts to contact you regarding your Anticoagulation Service appointments. Warfarin is a potent blood-thinning agent that requires monitoring to ensure that the dosage is correct for your body.  If it isn't, you could develop serious, sometimes life-threatening bleeding problems or life-threatening blood clots or stroke could result.     It is extremely important that you have your lab work drawn as soon as possible.  We are open Monday-Friday 8 am until 5 pm.  You may reach our Service at [phone].     To monitor you effectively, we need to be able to communicate with you.  This is a requirement to be followed by our Service.  If we are unable to contact you on three separate occasions, you will be discharged from the Anticoagulation Service.     If you repeatedly fail to keep your lab appointments, you are at risk of being discharged from the Service.           Sincerely,           Deep Verde, PharmD, BCPS  Pharmacy Clinical Supervisor  [facility]  Outpatient Anticoagulation Service

## 2020-01-21 NOTE — TELEPHONE ENCOUNTER
Left message for pt to have INR checked  2nd call  Letter sent   Serenity Deras, Clinical Pharmacist, CDE, CACP

## 2020-02-12 ENCOUNTER — TELEPHONE (OUTPATIENT)
Dept: VASCULAR LAB | Facility: MEDICAL CENTER | Age: 85
End: 2020-02-12

## 2020-03-06 ENCOUNTER — TELEPHONE (OUTPATIENT)
Dept: VASCULAR LAB | Facility: MEDICAL CENTER | Age: 85
End: 2020-03-06

## 2020-03-06 ENCOUNTER — ANTICOAGULATION MONITORING (OUTPATIENT)
Dept: VASCULAR LAB | Facility: MEDICAL CENTER | Age: 85
End: 2020-03-06

## 2020-03-06 NOTE — LETTER
March 6, 2020          Jacqueline Cameron  [address]  [place] [address]        Dear Jacqueline Cameron ,    We have been unsuccessful in our attempts to contact you regarding your Anticoagulation Service appointments. Warfarin is a potent blood-thinning agent that requires monitoring to ensure that the dosage is correct for your body.  If it isn't, you could develop serious, sometimes life-threatening bleeding problems or life-threatening blood clots or stroke could result.     It is extremely important that you have your lab work drawn as soon as possible.  We are open Monday-Friday 8 am until 5 pm.  You may reach our Service at [phone].     To monitor you effectively, we need to be able to communicate with you.  This is a requirement to be followed by our Service.  If we are unable to contact you on three separate occasions, you will be discharged from the Anticoagulation Service.     If you repeatedly fail to keep your lab appointments, you are at risk of being discharged from the Service.           Sincerely,           Deep Verde, PharmD, BCPS  Pharmacy Clinical Supervisor  [facility]  Outpatient Anticoagulation Service

## 2020-03-06 NOTE — TELEPHONE ENCOUNTER
Left message for pt to have INR checked  4th call  3rd letter sent  Serenity Deras, Clinical Pharmacist, CDE, CACP

## 2020-03-07 NOTE — PROGRESS NOTES
Discharged from [facility]ticoagulation [facility].  Secondary to non adherence/non compliance  Serenity Deras, Clinical Pharmacist, CDE, CACP

## 2020-03-19 NOTE — TELEPHONE ENCOUNTER
Nephrology Daily Progress Note     Lara Barnett  Date of Service: 3/19/2020        RECENT EVENTS / SUBJECTIVE:     Sitting in chair  Did not offer any complaints today  Denies nausea, dizziness, vision changes         PMHx, Social Hx , Medications and Allergies reviewed.      ALLERGIES:  No Known Allergies    OBJECTIVE:    Vital signs over past 48 Hours:  Vital Last Value 24 Hour Range   Temp 98 °F (36.7 °C) (03/19/20 1300) Temp  Min: 98 °F (36.7 °C)  Max: 98.3 °F (36.8 °C)   Pulse 79 (03/19/20 1300) Pulse  Min: 79  Max: 89   Respiratory 18 (03/19/20 1300) Resp  Min: 16  Max: 18   Non-Invasive  Blood Pressure 121/62 (03/19/20 1300) BP  Min: 121/62  Max: 136/64   Arterial  Blood Pressure   No data recorded   Pulse Ox 100 % (03/19/20 1300) SpO2  Min: 92 %  Max: 100 %     Ht/Wt Today Admitted   Weight 100 kg 99.8 kg   Height  5' 5\" (165.1 cm)   BMI 36.69 36.61       Weight over past 48 Hours:  Patient Vitals for the past 48 hrs:   Weight   03/18/20 0559 100.6 kg   03/19/20 0500 100 kg     Weight change: -0.6 kg    Intake/Output this shift:  I/O this shift:  In: 240 [P.O.:240]  Out: -     Intake/Output Last 3 Shifts:  I/O last 3 completed shifts:  In: 720 [P.O.:720]  Out: 2001 [Urine:1; Other:2000]    Vent settings for last 24 hours:       Hemodynamic parameters for last 24 hours:       Medications / Infusions  Scheduled:   • epoetin naya-epbx  10,000 Units Subcutaneous Once per day on Mon Wed Fri   • sevelamer carbonate  2,400 mg Oral TID WC   • hydrALAZINE  50 mg Oral 3 times per day   • heparin (porcine)  5,000 Units Subcutaneous 3 times per day   • B complex-vitamin C-folic acid  1 tablet Oral Daily   • gabapentin  100 mg Oral TID   • sodium chloride (PF)  2 mL Injection 2 times per day   • labetalol  100 mg Oral 2 times per day   • pantoprazole  40 mg Oral QAM AC   • amLODIPine  10 mg Oral Daily   • levETIRAcetam  500 mg Oral Nightly        Continuous Infusions:        PRN:   sodium chloride, polyethylene  Left message for pt to have INR checked  Serenity Filter, Clinical Pharmacist, CDE, CACP     glycol, cyproheptadine, butalbital-APAP-caffeine, hydrALAZINE (APRESOLINE) injection, ondansetron, sodium chloride, acetaminophen **OR** acetaminophen **OR** acetaminophen, sodium chloride, HYDROcodone-acetaminophen, labetalol, pneumococcal 23-valent vaccine         Physical Exam  Gen  NAD  HEENT  MMM, no icterus, no pallor   Neck  Supple, no JVD, no lymphadenopathy, no thyromegaly   Cardiac  RRR, S1, S2 no S3; no murmur, gallop, or rubs  Resp  CTA bilaterally, no IWOB  ABD  Soft, non tender, non distended, BS normoactive  Ext  No edema   KD site  Right lower extremity AV graft with bruit  Neuro Awake and interactive, no gross deficit        Laboratory Results     Recent Labs   Lab 03/19/20  0539 03/18/20  0426 03/17/20  0511 03/16/20  0450 03/15/20  0545  03/13/20  1035   SODIUM 134* 135 133* 136 135   < > 138   POTASSIUM 4.2 5.0 4.3 5.0 4.6   < > 3.9   CHLORIDE 100 101 100 101 100   < > 99   CO2 24 23 24 23 23   < > 27   BUN 32* 51* 34* 62* 43*   < > 15   CREATININE 5.96* 8.55* 6.52* 11.20* 9.28*   < > 4.50*   GLUCOSE 87 95 93 78 82   < > 103*   CALCIUM 9.7 8.7 8.9 8.4 8.4   < > 8.8   PHOS  --   --   --  7.3* 6.4*  --   --    ALBUMIN  --  2.8*  --  3.1*  --   --  4.0   AST  --  18  --  21  --   --  19   GPT  --  16  --  19  --   --  23   ALKPT  --  96  --  101  --   --  132*   BILIRUBIN  --  0.6  --  0.3  --   --  0.5    < > = values in this interval not displayed.       Anemia  Recent Labs   Lab 03/18/20  0426 03/16/20  0450 03/15/20  0545 03/14/20  0321 03/13/20  1035   WBC 5.6 6.5 6.2 6.0 6.4   HGB 8.6* 9.6* 9.0* 9.2* 10.8*   HCT 28.2* 30.7* 29.1* 29.4* 33.9*    150 142 137* 130*     No results found     Mineral & Bone Disorder  Recent Labs   Lab 03/19/20  0539 03/18/20  0426 03/17/20  0511 03/16/20  0450 03/15/20  0545   CALCIUM 9.7 8.7 8.9 8.4 8.4   PHOS  --   --   --  7.3* 6.4*      No results found for: PTH    Urine Panel  No results found for: UOSM, UK, 5UNITR, UCROA, UCL, TRAY, UKET, USPG, UPROT,  USPG, UWBC, URBC, 5UNITR, UBILI, UPH, UROB, USPG, UBACTR, UPROT       Assessment / Plan     End Stage Renal Disease, on chronic hemodialysis MWF, 4 hrs, EDW 98.5 kg   -Avoid nephrotoxins, NSAIDs, Fleets   -Pharmacy to ensure medications adjusted for reduced CrCl and HD    -Continue HD per Monday, Wednesday and Friday schedule     Anemia, of chronic kidney disease   -Retacrit 10,000 units MWF (increased 03/18)   -Hold MARILIA for Hgb > 11 g/dL    Hypertension / Volume   -Antihypertensives   -Optimal UF in HD, not to exceed 13 mL/kg/hr    Secondary Hyperparathyroidism, of chronic kidney disease    -Renvela TID with meals     Nutrition / Hypoalbuminemia    -Nephrovite daily        Recs  HD in am per MWF schedule   Optimal UF, in complicated context of hypertension, volume excess, but also recent ICH / SAH / IVH   BP goals per stroke service   Continue MARILIA  Labs in AM    Vero Chew MD   3/19/2020  Nephrology

## 2020-05-31 ENCOUNTER — APPOINTMENT (OUTPATIENT)
Dept: CARDIOLOGY | Facility: MEDICAL CENTER | Age: 85
DRG: 563 | End: 2020-05-31
Attending: INTERNAL MEDICINE
Payer: MEDICARE

## 2020-05-31 ENCOUNTER — HOSPITAL ENCOUNTER (OUTPATIENT)
Dept: RADIOLOGY | Facility: MEDICAL CENTER | Age: 85
End: 2020-05-31
Payer: MEDICARE

## 2020-05-31 ENCOUNTER — APPOINTMENT (OUTPATIENT)
Dept: RADIOLOGY | Facility: MEDICAL CENTER | Age: 85
DRG: 563 | End: 2020-05-31
Attending: ORTHOPAEDIC SURGERY
Payer: MEDICARE

## 2020-05-31 ENCOUNTER — APPOINTMENT (OUTPATIENT)
Dept: RADIOLOGY | Facility: MEDICAL CENTER | Age: 85
DRG: 563 | End: 2020-05-31
Attending: INTERNAL MEDICINE
Payer: MEDICARE

## 2020-05-31 ENCOUNTER — HOSPITAL ENCOUNTER (OUTPATIENT)
Facility: MEDICAL CENTER | Age: 85
DRG: 563 | End: 2020-05-31
Admitting: INTERNAL MEDICINE
Payer: MEDICARE

## 2020-05-31 ENCOUNTER — HOSPITAL ENCOUNTER (INPATIENT)
Facility: MEDICAL CENTER | Age: 85
LOS: 3 days | DRG: 563 | End: 2020-06-03
Attending: INTERNAL MEDICINE | Admitting: INTERNAL MEDICINE
Payer: MEDICARE

## 2020-05-31 DIAGNOSIS — S42.201A CLOSED FRACTURE OF PROXIMAL END OF RIGHT HUMERUS, UNSPECIFIED FRACTURE MORPHOLOGY, INITIAL ENCOUNTER: ICD-10-CM

## 2020-05-31 PROBLEM — S42.209A CLOSED FRACTURE OF PROXIMAL END OF HUMERUS: Status: ACTIVE | Noted: 2020-05-31

## 2020-05-31 LAB
ALBUMIN SERPL BCP-MCNC: 3.3 G/DL (ref 3.2–4.9)
ALBUMIN/GLOB SERPL: 1.1 G/DL
ALP SERPL-CCNC: 157 U/L (ref 30–99)
ALT SERPL-CCNC: 14 U/L (ref 2–50)
ANION GAP SERPL CALC-SCNC: 15 MMOL/L (ref 7–16)
AST SERPL-CCNC: 23 U/L (ref 12–45)
BASOPHILS # BLD AUTO: 0.3 % (ref 0–1.8)
BASOPHILS # BLD: 0.03 K/UL (ref 0–0.12)
BILIRUB SERPL-MCNC: 1.1 MG/DL (ref 0.1–1.5)
BUN SERPL-MCNC: 10 MG/DL (ref 8–22)
CALCIUM SERPL-MCNC: 8.1 MG/DL (ref 8.5–10.5)
CHLORIDE SERPL-SCNC: 95 MMOL/L (ref 96–112)
CO2 SERPL-SCNC: 21 MMOL/L (ref 20–33)
CREAT SERPL-MCNC: 0.49 MG/DL (ref 0.5–1.4)
EOSINOPHIL # BLD AUTO: 0.01 K/UL (ref 0–0.51)
EOSINOPHIL NFR BLD: 0.1 % (ref 0–6.9)
ERYTHROCYTE [DISTWIDTH] IN BLOOD BY AUTOMATED COUNT: 51.2 FL (ref 35.9–50)
GLOBULIN SER CALC-MCNC: 3 G/DL (ref 1.9–3.5)
GLUCOSE SERPL-MCNC: 163 MG/DL (ref 65–99)
HCT VFR BLD AUTO: 40.5 % (ref 37–47)
HGB BLD-MCNC: 13.2 G/DL (ref 12–16)
IMM GRANULOCYTES # BLD AUTO: 0.04 K/UL (ref 0–0.11)
IMM GRANULOCYTES NFR BLD AUTO: 0.4 % (ref 0–0.9)
INR PPP: 1.99 (ref 0.87–1.13)
LV EJECT FRACT  99904: 55
LYMPHOCYTES # BLD AUTO: 1.09 K/UL (ref 1–4.8)
LYMPHOCYTES NFR BLD: 11.3 % (ref 22–41)
MAGNESIUM SERPL-MCNC: 1.8 MG/DL (ref 1.5–2.5)
MCH RBC QN AUTO: 32.4 PG (ref 27–33)
MCHC RBC AUTO-ENTMCNC: 32.6 G/DL (ref 33.6–35)
MCV RBC AUTO: 99.5 FL (ref 81.4–97.8)
MONOCYTES # BLD AUTO: 1.15 K/UL (ref 0–0.85)
MONOCYTES NFR BLD AUTO: 11.9 % (ref 0–13.4)
NEUTROPHILS # BLD AUTO: 7.35 K/UL (ref 2–7.15)
NEUTROPHILS NFR BLD: 76 % (ref 44–72)
NRBC # BLD AUTO: 0 K/UL
NRBC BLD-RTO: 0 /100 WBC
PLATELET # BLD AUTO: 184 K/UL (ref 164–446)
PMV BLD AUTO: 10.2 FL (ref 9–12.9)
POTASSIUM SERPL-SCNC: 3.8 MMOL/L (ref 3.6–5.5)
PROT SERPL-MCNC: 6.3 G/DL (ref 6–8.2)
PROTHROMBIN TIME: 23.3 SEC (ref 12–14.6)
RBC # BLD AUTO: 4.07 M/UL (ref 4.2–5.4)
SODIUM SERPL-SCNC: 131 MMOL/L (ref 135–145)
WBC # BLD AUTO: 9.7 K/UL (ref 4.8–10.8)

## 2020-05-31 PROCEDURE — 93325 DOPPLER ECHO COLOR FLOW MAPG: CPT | Mod: 26 | Performed by: INTERNAL MEDICINE

## 2020-05-31 PROCEDURE — 770020 HCHG ROOM/CARE - TELE (206)

## 2020-05-31 PROCEDURE — 700111 HCHG RX REV CODE 636 W/ 250 OVERRIDE (IP): Performed by: INTERNAL MEDICINE

## 2020-05-31 PROCEDURE — 700102 HCHG RX REV CODE 250 W/ 637 OVERRIDE(OP): Performed by: INTERNAL MEDICINE

## 2020-05-31 PROCEDURE — 93321 DOPPLER ECHO F-UP/LMTD STD: CPT | Mod: 26 | Performed by: INTERNAL MEDICINE

## 2020-05-31 PROCEDURE — 73200 CT UPPER EXTREMITY W/O DYE: CPT | Mod: RT

## 2020-05-31 PROCEDURE — 73030 X-RAY EXAM OF SHOULDER: CPT | Mod: RT

## 2020-05-31 PROCEDURE — A9270 NON-COVERED ITEM OR SERVICE: HCPCS | Performed by: INTERNAL MEDICINE

## 2020-05-31 PROCEDURE — 700105 HCHG RX REV CODE 258: Performed by: INTERNAL MEDICINE

## 2020-05-31 PROCEDURE — 74018 RADEX ABDOMEN 1 VIEW: CPT

## 2020-05-31 PROCEDURE — 83735 ASSAY OF MAGNESIUM: CPT

## 2020-05-31 PROCEDURE — 93308 TTE F-UP OR LMTD: CPT

## 2020-05-31 PROCEDURE — 93308 TTE F-UP OR LMTD: CPT | Mod: 26 | Performed by: INTERNAL MEDICINE

## 2020-05-31 PROCEDURE — 99223 1ST HOSP IP/OBS HIGH 75: CPT | Mod: AI | Performed by: INTERNAL MEDICINE

## 2020-05-31 PROCEDURE — 36415 COLL VENOUS BLD VENIPUNCTURE: CPT

## 2020-05-31 PROCEDURE — 80053 COMPREHEN METABOLIC PANEL: CPT

## 2020-05-31 PROCEDURE — 85025 COMPLETE CBC W/AUTO DIFF WBC: CPT

## 2020-05-31 PROCEDURE — 94760 N-INVAS EAR/PLS OXIMETRY 1: CPT

## 2020-05-31 PROCEDURE — 85610 PROTHROMBIN TIME: CPT

## 2020-05-31 RX ORDER — WARFARIN SODIUM 3 MG/1
1.5 TABLET ORAL
Status: DISCONTINUED | OUTPATIENT
Start: 2020-06-01 | End: 2020-06-01

## 2020-05-31 RX ORDER — SODIUM CHLORIDE 9 MG/ML
1000 INJECTION, SOLUTION INTRAVENOUS ONCE
Status: DISCONTINUED | OUTPATIENT
Start: 2020-05-31 | End: 2020-05-31

## 2020-05-31 RX ORDER — AMOXICILLIN 250 MG
2 CAPSULE ORAL 2 TIMES DAILY
Status: DISCONTINUED | OUTPATIENT
Start: 2020-05-31 | End: 2020-06-03 | Stop reason: HOSPADM

## 2020-05-31 RX ORDER — POLYETHYLENE GLYCOL 3350 17 G/17G
1 POWDER, FOR SOLUTION ORAL
Status: DISCONTINUED | OUTPATIENT
Start: 2020-05-31 | End: 2020-06-03 | Stop reason: HOSPADM

## 2020-05-31 RX ORDER — WARFARIN SODIUM 3 MG/1
3 TABLET ORAL DAILY
Status: ON HOLD | COMMUNITY
End: 2020-06-03

## 2020-05-31 RX ORDER — PREGABALIN 75 MG/1
75 CAPSULE ORAL 2 TIMES DAILY
Status: DISCONTINUED | OUTPATIENT
Start: 2020-05-31 | End: 2020-06-03 | Stop reason: HOSPADM

## 2020-05-31 RX ORDER — BUMETANIDE 1 MG/1
1 TABLET ORAL 2 TIMES DAILY
Status: ON HOLD | COMMUNITY
End: 2020-06-03

## 2020-05-31 RX ORDER — ALENDRONATE SODIUM 70 MG/1
70 TABLET ORAL
COMMUNITY

## 2020-05-31 RX ORDER — SODIUM CHLORIDE 9 MG/ML
500 INJECTION, SOLUTION INTRAVENOUS ONCE
Status: COMPLETED | OUTPATIENT
Start: 2020-05-31 | End: 2020-05-31

## 2020-05-31 RX ORDER — OXYCODONE HYDROCHLORIDE 5 MG/1
10 TABLET ORAL EVERY 4 HOURS PRN
Status: ON HOLD | COMMUNITY
End: 2020-06-03

## 2020-05-31 RX ORDER — SACUBITRIL AND VALSARTAN 24; 26 MG/1; MG/1
1 TABLET, FILM COATED ORAL DAILY
COMMUNITY

## 2020-05-31 RX ORDER — BISACODYL 10 MG
10 SUPPOSITORY, RECTAL RECTAL
Status: DISCONTINUED | OUTPATIENT
Start: 2020-05-31 | End: 2020-06-03 | Stop reason: HOSPADM

## 2020-05-31 RX ORDER — WARFARIN SODIUM 3 MG/1
3 TABLET ORAL
Status: DISCONTINUED | OUTPATIENT
Start: 2020-05-31 | End: 2020-06-01

## 2020-05-31 RX ORDER — CARVEDILOL 3.12 MG/1
3.12 TABLET ORAL DAILY
Status: DISCONTINUED | OUTPATIENT
Start: 2020-05-31 | End: 2020-06-01

## 2020-05-31 RX ORDER — HYDROCODONE BITARTRATE AND ACETAMINOPHEN 5; 325 MG/1; MG/1
1-2 TABLET ORAL EVERY 6 HOURS PRN
Status: DISCONTINUED | OUTPATIENT
Start: 2020-05-31 | End: 2020-06-01

## 2020-05-31 RX ORDER — ACETAMINOPHEN 325 MG/1
650 TABLET ORAL EVERY 6 HOURS PRN
Status: DISCONTINUED | OUTPATIENT
Start: 2020-05-31 | End: 2020-06-03 | Stop reason: HOSPADM

## 2020-05-31 RX ORDER — LABETALOL HYDROCHLORIDE 5 MG/ML
10 INJECTION, SOLUTION INTRAVENOUS EVERY 4 HOURS PRN
Status: DISCONTINUED | OUTPATIENT
Start: 2020-05-31 | End: 2020-06-03 | Stop reason: HOSPADM

## 2020-05-31 RX ORDER — DIGOXIN 125 MCG
125 TABLET ORAL DAILY
Status: DISCONTINUED | OUTPATIENT
Start: 2020-05-31 | End: 2020-06-03 | Stop reason: HOSPADM

## 2020-05-31 RX ORDER — ACETAMINOPHEN 325 MG/1
650 TABLET ORAL EVERY 6 HOURS PRN
Status: CANCELLED | OUTPATIENT
Start: 2020-05-31

## 2020-05-31 RX ORDER — HYDROMORPHONE HYDROCHLORIDE 1 MG/ML
1 INJECTION, SOLUTION INTRAMUSCULAR; INTRAVENOUS; SUBCUTANEOUS
Status: COMPLETED | OUTPATIENT
Start: 2020-05-31 | End: 2020-05-31

## 2020-05-31 RX ORDER — CARVEDILOL 3.12 MG/1
3.12 TABLET ORAL DAILY
Status: ON HOLD | COMMUNITY
End: 2020-06-03

## 2020-05-31 RX ORDER — MORPHINE SULFATE 4 MG/ML
2 INJECTION, SOLUTION INTRAMUSCULAR; INTRAVENOUS EVERY 4 HOURS PRN
Status: DISCONTINUED | OUTPATIENT
Start: 2020-05-31 | End: 2020-06-03 | Stop reason: HOSPADM

## 2020-05-31 RX ADMIN — DIGOXIN 125 MCG: 125 TABLET ORAL at 18:36

## 2020-05-31 RX ADMIN — PREGABALIN 75 MG: 75 CAPSULE ORAL at 12:34

## 2020-05-31 RX ADMIN — CARVEDILOL 3.12 MG: 3.12 TABLET, FILM COATED ORAL at 12:37

## 2020-05-31 RX ADMIN — WARFARIN SODIUM 3 MG: 3 TABLET ORAL at 22:43

## 2020-05-31 RX ADMIN — DOCUSATE SODIUM 50 MG AND SENNOSIDES 8.6 MG 2 TABLET: 8.6; 5 TABLET, FILM COATED ORAL at 18:36

## 2020-05-31 RX ADMIN — MORPHINE SULFATE 2 MG: 4 INJECTION INTRAVENOUS at 22:44

## 2020-05-31 RX ADMIN — SODIUM CHLORIDE 500 ML: 9 INJECTION, SOLUTION INTRAVENOUS at 11:38

## 2020-05-31 RX ADMIN — HYDROCODONE BITARTRATE AND ACETAMINOPHEN 2 TABLET: 5; 325 TABLET ORAL at 12:33

## 2020-05-31 RX ADMIN — MORPHINE SULFATE 2 MG: 4 INJECTION INTRAVENOUS at 11:38

## 2020-05-31 RX ADMIN — HYDROMORPHONE HYDROCHLORIDE 1 MG: 1 INJECTION, SOLUTION INTRAMUSCULAR; INTRAVENOUS; SUBCUTANEOUS at 13:45

## 2020-05-31 RX ADMIN — PREGABALIN 75 MG: 75 CAPSULE ORAL at 18:36

## 2020-05-31 RX ADMIN — HYDROCODONE BITARTRATE AND ACETAMINOPHEN 2 TABLET: 5; 325 TABLET ORAL at 18:35

## 2020-05-31 ASSESSMENT — COPD QUESTIONNAIRES
HAVE YOU SMOKED AT LEAST 100 CIGARETTES IN YOUR ENTIRE LIFE: NO/DON'T KNOW
DO YOU EVER COUGH UP ANY MUCUS OR PHLEGM?: NO/ONLY WITH OCCASIONAL COLDS OR INFECTIONS
DURING THE PAST 4 WEEKS HOW MUCH DID YOU FEEL SHORT OF BREATH: NONE/LITTLE OF THE TIME
COPD SCREENING SCORE: 3

## 2020-05-31 ASSESSMENT — ENCOUNTER SYMPTOMS
FALLS: 1
TREMORS: 0
MYALGIAS: 1
CHILLS: 0
ORTHOPNEA: 0
HALLUCINATIONS: 0
PHOTOPHOBIA: 0
HEADACHES: 0
SENSORY CHANGE: 0
BACK PAIN: 0
CONSTIPATION: 0
SPUTUM PRODUCTION: 0
PALPITATIONS: 0
COUGH: 0
WEIGHT LOSS: 0
NECK PAIN: 0
VOMITING: 0
DIZZINESS: 0
FOCAL WEAKNESS: 0
DOUBLE VISION: 0
DIARRHEA: 0
BLURRED VISION: 0
SPEECH CHANGE: 0
TINGLING: 0
FEVER: 0
ABDOMINAL PAIN: 0
NAUSEA: 0
EYE PAIN: 0
SHORTNESS OF BREATH: 0

## 2020-05-31 ASSESSMENT — LIFESTYLE VARIABLES
SUBSTANCE_ABUSE: 0
EVER_SMOKED: NEVER

## 2020-05-31 ASSESSMENT — CHA2DS2 SCORE
HYPERTENSION: NO
AGE 75 OR GREATER: YES
CHA2DS2 VASC SCORE: 5
PRIOR STROKE OR TIA OR THROMBOEMBOLISM: NO
DIABETES: NO
CHF OR LEFT VENTRICULAR DYSFUNCTION: YES
AGE 65 TO 74: NO
SEX: FEMALE
VASCULAR DISEASE: YES

## 2020-05-31 ASSESSMENT — FIBROSIS 4 INDEX: FIB4 SCORE: 2.806243040080456039

## 2020-05-31 NOTE — PROGRESS NOTES
Patient is coming from [place] with a complaint of shoulder pain.  Upon arrival, imaging did show a right shoulder dislocation with a fracture of the humerus.  ERP did discuss the case with orthopedic surgery, Dr. Haider who will consult and has agreed to the transfer.  She does have a history of atrial fibrillation, is currently in atrial fibrillation with RVR with a rate of around 115.  Patient will be monitored on telemetry.  Otherwise hemodynamics and oxygenation are stable.

## 2020-05-31 NOTE — PROGRESS NOTES
Received transfer request from [facility]  Sending Physician: Dr. Medrano  Diagnosis: Humerus Fracture  Type of Transfer Requested: ED to Direct Admit  Specialist consulted: Dr. Haider-Ortho  Hospitalist consulted: Dr. Oneill  Patient Accepted  Accepting Physician: Dr. Oneill  Patient coming via: Ground  Room Assignment: T 707-2  Nursing to notify On Call Hospitalist upon patient arrival on unit.

## 2020-05-31 NOTE — ASSESSMENT & PLAN NOTE
She has a history of atrial fibrillation.  Continue outpatient digoxin.  Continue Coumadin, transition to metoprolol from Coreg for better heart rate control  Follow-up INR, goal 2-3

## 2020-05-31 NOTE — ASSESSMENT & PLAN NOTE
right shoulder anterior dislocation with proximal right humerus fracture.  Orthopedic surgery consulted.  Orthopedic surgery recommended conservative management pain management and sling  PTOT  Lidocaine and PRN oxycodone. Avoiding NSAIDs given warfarin and tylenol, patient reports liver disease

## 2020-05-31 NOTE — ASSESSMENT & PLAN NOTE
She has history of chronic systolic heart failure and her last echocardiogram showed an EMR ejection fraction of 15%.  Hold  Bumex and consider spironolactone   TTE shows EF improved to 55%  Euvolemic

## 2020-05-31 NOTE — H&P
Hospital Medicine History & Physical Note    Date of Service  5/31/2020    Primary Care Physician  Teodoro Oneill M.D.    Code Status    Full Code I discussed this on admission    Chief Complaint  Ground-level fall and developed shoulder pain    History of Presenting Illness    84 y.o. female with past medical history of atrial fibrillation currently on warfarin who presented to the hospital on 5/31/2020 as a transfer from outside facility due to complaint of fall and developed right shoulder pain and she found to have fracture and she transferred to [facility] for higher level of care. I evaluated and examined her at the bedside she reported she came out from the bathroom and she was about to reach her walker and while she was trying to reach her walker she missed tit and she had a fall she landed on her right shoulder and her knees.  She denies losing consciousness and she denies hitting her head.She reported that she has chronic back pain bilateral chronic hip pain and she uses walker and cane at home.She expressed severe pain on her right shoulder which is involving whole right shoulder there is no specific radiation pain aggravated with movement and there is no specific alleviating factors.She denies any symptoms of nausea, vomiting, diarrhea and constipation.  She has history of atrial fibrillation and she is on warfarin.She also reported pain in her left knee and she expressed that she has chronic back and hip pain.She has abdominal distention and denies symptoms of nausea, vomiting, constipation and pain.    I reviewed her outside medical record which showed WBC of 11.31, hemoglobin 15.57, Platelet 236,Right humerus x-ray showed right shoulder anterior dislocation with proximal right humerus fracture.  Left knee x-ray unremarkable for fracture.    Review of Systems  Review of Systems   Constitutional: Negative for chills, fever and weight loss.   HENT: Negative for hearing loss and tinnitus.    Eyes: Negative for  blurred vision, double vision, photophobia and pain.   Respiratory: Negative for cough, sputum production and shortness of breath.    Cardiovascular: Negative for chest pain, palpitations, orthopnea and leg swelling.   Gastrointestinal: Negative for abdominal pain, constipation, diarrhea, nausea and vomiting.   Genitourinary: Negative for dysuria, frequency and urgency.   Musculoskeletal: Positive for falls, joint pain and myalgias. Negative for back pain and neck pain.   Skin: Negative for rash.   Neurological: Negative for dizziness, tingling, tremors, sensory change, speech change, focal weakness and headaches.   Psychiatric/Behavioral: Negative for hallucinations and substance abuse.   All other systems reviewed and are negative.      Past Medical History   has a past medical history of A-fib, Congestive heart failure, Diabetes, and MI (myocardial infarction).    Surgical History   has a past surgical history that includes other cardiac surgery.     Family History  I reviewed family history with patient she denies any pertinent family history.     Social History   reports that she has never smoked. She does not have any smokeless tobacco history on file. She reports that she does not drink alcohol or use drugs.    Allergies  No Known Allergies    Medications  Prior to Admission Medications   Prescriptions Last Dose Informant Patient Reported? Taking?   Oxycodone-Acetaminophen (PERCOCET-10)  MG Tab   No No   Sig: Take 1 Tab by mouth every four hours as needed for Moderate Pain or Severe Pain.   atorvastatin (LIPITOR) 20 MG Tab   No No   Sig: Take 1 Tab by mouth every bedtime.   clonazepam (KLONOPIN) 0.5 MG Tab   Yes No   Sig: Take 0.25 mg by mouth 2 times a day.   digoxin (LANOXIN) 125 MCG Tab   No No   Sig: Take 1 Tab by mouth every day at 6 PM.   losartan (COZAAR) 25 MG Tab   Yes No   Sig: Take 25 mg by mouth every day.   methimazole (TAPAZOLE) 5 MG Tab   No No   Sig: Take 2 Tabs by mouth every day.    metolazone (ZAROXOLYN) 2.5 MG Tab   No No   Sig: Take 1 Tab by mouth every day.   metoprolol SR (TOPROL XL) 50 MG TABLET SR 24 HR   No No   Sig: Take 1 Tab by mouth 2 Times a Day.   pioglitazone (ACTOS) 15 MG Tab   Yes No   Sig: Take 15 mg by mouth every day.   potassium chloride SA (K-DUR) 10 MEQ Tab CR   Yes No   Sig: Take 10 mEq by mouth 2 times a day.   pregabalin (LYRICA) 75 MG Cap   No No   Sig: Take 1 Cap by mouth 2 Times a Day.   senna-docusate (PERICOLACE OR SENOKOT S) 8.6-50 MG Tab   Yes No   Sig: Take 1 Tab by mouth every day.   spironolactone (ALDACTONE) 25 MG Tab   No No   Sig: Take 1 Tab by mouth every day.   torsemide (DEMADEX) 20 MG Tab   No No   Sig: Take 1 Tab by mouth 2 times a day. appt for further refills   warfarin (COUMADIN) 1 MG Tab   No No   Sig: Take 1 Tab by mouth See Admin Instructions.      Facility-Administered Medications: None       Physical Exam       Physical Exam  Vitals signs reviewed.   Constitutional:       General: She is not in acute distress.     Appearance: Normal appearance. She is not ill-appearing.   HENT:      Head: Normocephalic and atraumatic.      Nose: No congestion.   Eyes:      General:         Right eye: No discharge.         Left eye: No discharge.      Pupils: Pupils are equal, round, and reactive to light.   Neck:      Musculoskeletal: Normal range of motion. No neck rigidity.   Cardiovascular:      Rate and Rhythm: Tachycardia present. Rhythm irregular.      Pulses: Normal pulses.      Heart sounds: Normal heart sounds. No murmur.   Pulmonary:      Effort: Pulmonary effort is normal. No respiratory distress.      Breath sounds: Normal breath sounds. No stridor.   Abdominal:      General: Bowel sounds are normal. There is distension.      Palpations: Abdomen is soft.      Tenderness: There is no abdominal tenderness.      Comments: No signs of acute abdomen.   Musculoskeletal: Normal range of motion.         General: Swelling and tenderness present.       Comments: Right upper extremity bruising and swelling  She was able to make a fist On right side  Left knee swelling   Skin:     General: Skin is warm.      Capillary Refill: Capillary refill takes less than 2 seconds.      Coloration: Skin is not jaundiced or pale.      Findings: Bruising present.   Neurological:      General: No focal deficit present.      Mental Status: She is alert and oriented to person, place, and time.      Cranial Nerves: No cranial nerve deficit.      Sensory: No sensory deficit.      Comments: Bilateral lower extremity weakness (Chronic)   Psychiatric:         Mood and Affect: Mood normal.         Behavior: Behavior normal.         Laboratory:          No results for input(s): ALTSGPT, ASTSGOT, ALKPHOSPHAT, TBILIRUBIN, DBILIRUBIN, GAMMAGT, AMYLASE, LIPASE, ALB, PREALBUMIN, GLUCOSE in the last 72 hours.      No results for input(s): NTPROBNP in the last 72 hours.      No results for input(s): TROPONINT in the last 72 hours.    Imaging:  No orders to display         Assessment/Plan:      Closed fracture of proximal end of humerus  Assessment & Plan  She found to have a right shoulder anterior dislocation with proximal right humerus fracture.  Orthopedic surgery consulted.  I discussed with orthopedic surgery regarding her condition.  Currently n.p.o. for possible procedure.  I ordered INR, CBC and CMP.  I ordered IV morphine 2 mg every 4 hours PRN with holding parameters.  Monitor for adverse effect of narcotic pain medication    Chronic systolic heart failure (HCC)  Assessment & Plan  She has history of chronic systolic heart failure and her last echocardiogram showed an EMR ejection fraction of 15%.  I held her Bumex as she has not been eating and drinking.  I ordered IV fluid 50 cc/h for total of 500 cc.  Use IV fluids with caution due to patient low ejection fraction.  We will resume Bumex afterHer n.p.o. she is on diet.  I ordered echocardiogram.  I discussed CODE STATUS with her and  she would like to be full code due to her severe low ejection fraction I requested consult with palliative care.    Atrial fibrillation (HCC)- (present on admission)  Assessment & Plan  She has a history of atrial fibrillation.  Continue outpatient digoxin.  Held Coumadin as she may need surgery.  Admitted telemetry for close monitoring.

## 2020-06-01 PROBLEM — E87.1 HYPONATREMIA: Status: ACTIVE | Noted: 2020-06-01

## 2020-06-01 LAB
ALBUMIN SERPL BCP-MCNC: 3.2 G/DL (ref 3.2–4.9)
ALBUMIN/GLOB SERPL: 1 G/DL
ALP SERPL-CCNC: 158 U/L (ref 30–99)
ALT SERPL-CCNC: 13 U/L (ref 2–50)
ANION GAP SERPL CALC-SCNC: 14 MMOL/L (ref 7–16)
AST SERPL-CCNC: 20 U/L (ref 12–45)
BASOPHILS # BLD AUTO: 0.2 % (ref 0–1.8)
BASOPHILS # BLD: 0.03 K/UL (ref 0–0.12)
BILIRUB SERPL-MCNC: 1.5 MG/DL (ref 0.1–1.5)
BUN SERPL-MCNC: 9 MG/DL (ref 8–22)
CALCIUM SERPL-MCNC: 8.4 MG/DL (ref 8.5–10.5)
CHLORIDE SERPL-SCNC: 96 MMOL/L (ref 96–112)
CO2 SERPL-SCNC: 23 MMOL/L (ref 20–33)
CREAT SERPL-MCNC: 0.45 MG/DL (ref 0.5–1.4)
DIGOXIN SERPL-MCNC: 0.9 NG/ML (ref 0.8–2)
EOSINOPHIL # BLD AUTO: 0.02 K/UL (ref 0–0.51)
EOSINOPHIL NFR BLD: 0.2 % (ref 0–6.9)
ERYTHROCYTE [DISTWIDTH] IN BLOOD BY AUTOMATED COUNT: 49.7 FL (ref 35.9–50)
GLOBULIN SER CALC-MCNC: 3.3 G/DL (ref 1.9–3.5)
GLUCOSE SERPL-MCNC: 183 MG/DL (ref 65–99)
HCT VFR BLD AUTO: 41.1 % (ref 37–47)
HGB BLD-MCNC: 13.5 G/DL (ref 12–16)
IMM GRANULOCYTES # BLD AUTO: 0.06 K/UL (ref 0–0.11)
IMM GRANULOCYTES NFR BLD AUTO: 0.5 % (ref 0–0.9)
INR PPP: 1.76 (ref 0.87–1.13)
LYMPHOCYTES # BLD AUTO: 1.2 K/UL (ref 1–4.8)
LYMPHOCYTES NFR BLD: 9.5 % (ref 22–41)
MCH RBC QN AUTO: 32.5 PG (ref 27–33)
MCHC RBC AUTO-ENTMCNC: 32.8 G/DL (ref 33.6–35)
MCV RBC AUTO: 98.8 FL (ref 81.4–97.8)
MONOCYTES # BLD AUTO: 1.69 K/UL (ref 0–0.85)
MONOCYTES NFR BLD AUTO: 13.4 % (ref 0–13.4)
NEUTROPHILS # BLD AUTO: 9.65 K/UL (ref 2–7.15)
NEUTROPHILS NFR BLD: 76.2 % (ref 44–72)
NRBC # BLD AUTO: 0 K/UL
NRBC BLD-RTO: 0 /100 WBC
PLATELET # BLD AUTO: 215 K/UL (ref 164–446)
PMV BLD AUTO: 9.9 FL (ref 9–12.9)
POTASSIUM SERPL-SCNC: 4.2 MMOL/L (ref 3.6–5.5)
PROT SERPL-MCNC: 6.5 G/DL (ref 6–8.2)
PROTHROMBIN TIME: 21.1 SEC (ref 12–14.6)
RBC # BLD AUTO: 4.16 M/UL (ref 4.2–5.4)
SODIUM SERPL-SCNC: 133 MMOL/L (ref 135–145)
WBC # BLD AUTO: 12.7 K/UL (ref 4.8–10.8)

## 2020-06-01 PROCEDURE — 80053 COMPREHEN METABOLIC PANEL: CPT

## 2020-06-01 PROCEDURE — 770020 HCHG ROOM/CARE - TELE (206)

## 2020-06-01 PROCEDURE — 85610 PROTHROMBIN TIME: CPT

## 2020-06-01 PROCEDURE — 99233 SBSQ HOSP IP/OBS HIGH 50: CPT | Performed by: HOSPITALIST

## 2020-06-01 PROCEDURE — A9270 NON-COVERED ITEM OR SERVICE: HCPCS | Performed by: INTERNAL MEDICINE

## 2020-06-01 PROCEDURE — 85025 COMPLETE CBC W/AUTO DIFF WBC: CPT

## 2020-06-01 PROCEDURE — 80162 ASSAY OF DIGOXIN TOTAL: CPT

## 2020-06-01 PROCEDURE — 700102 HCHG RX REV CODE 250 W/ 637 OVERRIDE(OP): Performed by: INTERNAL MEDICINE

## 2020-06-01 PROCEDURE — 36415 COLL VENOUS BLD VENIPUNCTURE: CPT

## 2020-06-01 PROCEDURE — A9270 NON-COVERED ITEM OR SERVICE: HCPCS | Performed by: HOSPITALIST

## 2020-06-01 PROCEDURE — 97161 PT EVAL LOW COMPLEX 20 MIN: CPT

## 2020-06-01 PROCEDURE — 97166 OT EVAL MOD COMPLEX 45 MIN: CPT

## 2020-06-01 PROCEDURE — 700102 HCHG RX REV CODE 250 W/ 637 OVERRIDE(OP): Performed by: HOSPITALIST

## 2020-06-01 PROCEDURE — 700111 HCHG RX REV CODE 636 W/ 250 OVERRIDE (IP): Performed by: INTERNAL MEDICINE

## 2020-06-01 RX ORDER — WARFARIN SODIUM 3 MG/1
3 TABLET ORAL
Status: COMPLETED | OUTPATIENT
Start: 2020-06-01 | End: 2020-06-01

## 2020-06-01 RX ORDER — HYDROCODONE BITARTRATE AND ACETAMINOPHEN 5; 325 MG/1; MG/1
1-2 TABLET ORAL EVERY 4 HOURS PRN
Status: DISCONTINUED | OUTPATIENT
Start: 2020-06-01 | End: 2020-06-02

## 2020-06-01 RX ORDER — WARFARIN SODIUM 3 MG/1
1.5 TABLET ORAL
Status: DISCONTINUED | OUTPATIENT
Start: 2020-06-02 | End: 2020-06-02

## 2020-06-01 RX ORDER — WARFARIN SODIUM 3 MG/1
3 TABLET ORAL
Status: DISCONTINUED | OUTPATIENT
Start: 2020-06-02 | End: 2020-06-02

## 2020-06-01 RX ADMIN — DOCUSATE SODIUM 50 MG AND SENNOSIDES 8.6 MG 2 TABLET: 8.6; 5 TABLET, FILM COATED ORAL at 18:00

## 2020-06-01 RX ADMIN — HYDROCODONE BITARTRATE AND ACETAMINOPHEN 1 TABLET: 5; 325 TABLET ORAL at 06:07

## 2020-06-01 RX ADMIN — WARFARIN SODIUM 3 MG: 3 TABLET ORAL at 17:45

## 2020-06-01 RX ADMIN — MORPHINE SULFATE 2 MG: 4 INJECTION INTRAVENOUS at 21:15

## 2020-06-01 RX ADMIN — PREGABALIN 75 MG: 75 CAPSULE ORAL at 05:05

## 2020-06-01 RX ADMIN — SACUBITRIL AND VALSARTAN 1 TABLET: 24; 26 TABLET, FILM COATED ORAL at 05:06

## 2020-06-01 RX ADMIN — HYDROCODONE BITARTRATE AND ACETAMINOPHEN 2 TABLET: 5; 325 TABLET ORAL at 22:14

## 2020-06-01 RX ADMIN — HYDROCODONE BITARTRATE AND ACETAMINOPHEN 2 TABLET: 5; 325 TABLET ORAL at 11:37

## 2020-06-01 RX ADMIN — PREGABALIN 75 MG: 75 CAPSULE ORAL at 18:00

## 2020-06-01 RX ADMIN — METOPROLOL TARTRATE 25 MG: 25 TABLET, FILM COATED ORAL at 11:36

## 2020-06-01 RX ADMIN — MORPHINE SULFATE 2 MG: 4 INJECTION INTRAVENOUS at 13:40

## 2020-06-01 RX ADMIN — CARVEDILOL 3.12 MG: 3.12 TABLET, FILM COATED ORAL at 05:05

## 2020-06-01 RX ADMIN — DIGOXIN 125 MCG: 125 TABLET ORAL at 17:39

## 2020-06-01 RX ADMIN — HYDROCODONE BITARTRATE AND ACETAMINOPHEN 2 TABLET: 5; 325 TABLET ORAL at 17:32

## 2020-06-01 RX ADMIN — MORPHINE SULFATE 2 MG: 4 INJECTION INTRAVENOUS at 05:05

## 2020-06-01 ASSESSMENT — ENCOUNTER SYMPTOMS
HEARTBURN: 0
BACK PAIN: 1
FEVER: 0
NECK PAIN: 0
CHILLS: 0
HEMOPTYSIS: 0
SINUS PAIN: 0
BRUISES/BLEEDS EASILY: 0
PALPITATIONS: 0
WHEEZING: 0
NAUSEA: 0
CLAUDICATION: 0
DEPRESSION: 0
PND: 0
BLURRED VISION: 0
DIZZINESS: 0
MYALGIAS: 0
HEADACHES: 0
VOMITING: 0
DOUBLE VISION: 0
COUGH: 0

## 2020-06-01 ASSESSMENT — COGNITIVE AND FUNCTIONAL STATUS - GENERAL
MOVING FROM LYING ON BACK TO SITTING ON SIDE OF FLAT BED: A LITTLE
DAILY ACTIVITIY SCORE: 14
SUGGESTED CMS G CODE MODIFIER MOBILITY: CK
DRESSING REGULAR LOWER BODY CLOTHING: A LOT
SUGGESTED CMS G CODE MODIFIER MOBILITY: CK
STANDING UP FROM CHAIR USING ARMS: A LITTLE
DRESSING REGULAR UPPER BODY CLOTHING: A LOT
MOVING TO AND FROM BED TO CHAIR: A LITTLE
SUGGESTED CMS G CODE MODIFIER DAILY ACTIVITY: CK
TURNING FROM BACK TO SIDE WHILE IN FLAT BAD: A LOT
TOILETING: A LOT
MOBILITY SCORE: 15
EATING MEALS: A LITTLE
TURNING FROM BACK TO SIDE WHILE IN FLAT BAD: UNABLE
CLIMB 3 TO 5 STEPS WITH RAILING: A LITTLE
HELP NEEDED FOR BATHING: A LITTLE
DRESSING REGULAR UPPER BODY CLOTHING: A LOT
SUGGESTED CMS G CODE MODIFIER DAILY ACTIVITY: CK
PERSONAL GROOMING: A LITTLE
DAILY ACTIVITIY SCORE: 15
CLIMB 3 TO 5 STEPS WITH RAILING: A LITTLE
WALKING IN HOSPITAL ROOM: A LITTLE
MOBILITY SCORE: 16
MOVING TO AND FROM BED TO CHAIR: A LOT
PERSONAL GROOMING: A LOT
STANDING UP FROM CHAIR USING ARMS: A LOT
TOILETING: A LOT
DRESSING REGULAR LOWER BODY CLOTHING: A LOT
WALKING IN HOSPITAL ROOM: A LITTLE
MOVING FROM LYING ON BACK TO SITTING ON SIDE OF FLAT BED: A LITTLE
HELP NEEDED FOR BATHING: A LOT

## 2020-06-01 ASSESSMENT — PATIENT HEALTH QUESTIONNAIRE - PHQ9
2. FEELING DOWN, DEPRESSED, IRRITABLE, OR HOPELESS: NOT AT ALL
1. LITTLE INTEREST OR PLEASURE IN DOING THINGS: NOT AT ALL
SUM OF ALL RESPONSES TO PHQ9 QUESTIONS 1 AND 2: 0

## 2020-06-01 ASSESSMENT — GAIT ASSESSMENTS
GAIT LEVEL OF ASSIST: MINIMAL ASSIST
DEVIATION: BRADYKINETIC;SHUFFLED GAIT
DISTANCE (FEET): 10
ASSISTIVE DEVICE: HAND HELD ASSIST

## 2020-06-01 ASSESSMENT — ACTIVITIES OF DAILY LIVING (ADL): TOILETING: INDEPENDENT

## 2020-06-01 ASSESSMENT — FIBROSIS 4 INDEX: FIB4 SCORE: 2.17

## 2020-06-01 NOTE — THERAPY
"Occupational Therapy   Initial Evaluation     Patient Name: Jacqueline Cameron  Age:  84 y.o., Sex:  female  Medical Record #: [id number]  Today's Date: 6/1/2020     Precautions  Precautions: Fall Risk, Non Weight Bearing Left Upper Extremity, Sling Left Upper Extremity    Assessment  Patient is 84 y.o. female with a diagnosis of  GLF resulting in R humeral fx non op. Pt w/ hx of chronic pain. Pt primarily limited by pain in R shoulder throughout session. Pt resistant to re-positioning initially, however w/ ed was willing to allow. Pt demo'd impaired balance, activity tolerance, R UE function, and functional mobility impacting functional independence. Pt reports she lives alone and dtr lives next door but works full time. Recommend placement.     Plan    Recommend Occupational Therapy 3 times per week until therapy goals are met for the following treatments:  Adaptive Equipment, Neuro Re-Education / Balance, Self Care/Activities of Daily Living, Therapeutic Activities and Therapeutic Exercises.    Discharge recommendations:  Recommend post-acute placement for additional occupational therapy services prior to discharge home.        Subjective    Back in my day, they just put a cast on it\"     Objective       06/01/20 1028   Prior Living Situation   Prior Services None   Housing / Facility [address] [facility]   Bathroom Set up Bathtub / Shower Combination;Shower Chair   Equipment Owned Front-Wheel Walker   Lives with - Patient's Self Care Capacity Alone and Able to Care For Self   Comments Dtr lives next door and works during the day   Prior Level of ADL Function   Self Feeding Independent   Grooming / Hygiene Independent   Bathing Independent   Dressing Independent   Toileting Independent   Precautions   Precautions Fall Risk;Non Weight Bearing Left Upper Extremity;Sling Left Upper Extremity   Vitals   O2 Delivery Device Silicone Nasal Cannula  (as found)   Pain 0 - 10 Group   Therapist Pain Assessment Post Activity Pain " Same as Prior to Activity;Nurse Notified;10  (10/10 pain L UE and back. RN aware)   Cognition    Cognition / Consciousness X   Level of Consciousness Alert   Comments pleasent and cooperative, perseverative on pain management and lack of cast   Active ROM Upper Body   Comments R UE NT 2/2 shoulder fx   Coordination Upper Body   Comments Pt would not demo any coordination tasks w/ R UE, L UE WFL   Bed Mobility    Supine to Sit   (NT in chair post)   Sit to Supine Maximal Assist   Scooting Maximal Assist  (to scoot up in bed)   Skilled Intervention Verbal Cuing   ADL Assessment   Grooming Supervision;Seated   Upper Body Dressing Maximal Assist  (sling)   Lower Body Dressing Maximal Assist  (socks)   Functional Mobility   Sit to Stand Moderate Assist  (from chair )   Bed, Chair, Wheelchair Transfer Moderate Assist   Transfer Method Stand Step   Mobility chair to bed

## 2020-06-01 NOTE — CARE PLAN
Problem: Knowledge Deficit  Goal: Knowledge of disease process/condition, treatment plan, diagnostic tests, and medications will improve  Outcome: PROGRESSING AS EXPECTED     Problem: Pain Management  Goal: Pain level will decrease to patient's comfort goal  Outcome: PROGRESSING AS EXPECTED     Problem: Respiratory:  Goal: Respiratory status will improve  Outcome: PROGRESSING AS EXPECTED

## 2020-06-01 NOTE — PROGRESS NOTES
[facility] Medicine Daily Progress Note    Date of Service  6/1/2020    Chief Complaint  84 y.o. female admitted 5/31/2020 with ground-level fall with rightproximal humeral fracture involving the surgical neck and greater tuberosity      Interval Problem Update  Patient continued complaining of pain, no nausea no vomiting no dizziness or lightheadedness, high blood pressure remains soft we will continue holding her diuretics for now, I have adjusted her pain medications, PT OT recommending skilled nursing placement, I have discussed with patient and patient's daughter regarding plan of care we have placed order for SNF, all questions have been answered.    Consultants/Specialty  Orthopedic surgery    Code Status  DNR/DNI    Disposition  SNF    Review of Systems  Review of Systems   Constitutional: Negative for chills and fever.   HENT: Negative for congestion and sinus pain.    Eyes: Negative for blurred vision and double vision.   Respiratory: Negative for cough, hemoptysis and wheezing.    Cardiovascular: Negative for chest pain, palpitations, claudication, leg swelling and PND.   Gastrointestinal: Negative for heartburn, nausea and vomiting.   Genitourinary: Negative for frequency, hematuria and urgency.   Musculoskeletal: Positive for back pain. Negative for myalgias and neck pain.        Right arm pain   Skin: Negative for rash.   Neurological: Negative for dizziness and headaches.   Endo/Heme/Allergies: Does not bruise/bleed easily.   Psychiatric/Behavioral: Negative for depression and suicidal ideas.        Physical Exam  Temp:  [36.3 °C (97.3 °F)-36.8 °C (98.3 °F)] 36.8 °C (98.2 °F)  Pulse:  [] 114  Resp:  [15-18] 15  BP: ()/(56-66) 102/60  SpO2:  [94 %-98 %] 94 %    Physical Exam  Vitals signs and nursing note reviewed.   Constitutional:       Appearance: Normal appearance.   HENT:      Head: Normocephalic.      Mouth/Throat:      Mouth: Mucous membranes are moist.      Pharynx: Oropharynx is  clear.   Eyes:      General: No scleral icterus.        Right eye: No discharge.         Left eye: No discharge.      Extraocular Movements: Extraocular movements intact.      Conjunctiva/sclera: Conjunctivae normal.      Pupils: Pupils are equal, round, and reactive to light.   Neck:      Musculoskeletal: Normal range of motion and neck supple.   Cardiovascular:      Rate and Rhythm: Normal rate and regular rhythm.      Pulses: Normal pulses.      Heart sounds: Normal heart sounds.   Pulmonary:      Effort: Pulmonary effort is normal. No respiratory distress.      Breath sounds: Normal breath sounds. No wheezing.   Abdominal:      General: Bowel sounds are normal. There is no distension.      Palpations: Abdomen is soft.      Tenderness: There is no abdominal tenderness. There is no guarding.   Musculoskeletal:         General: Tenderness present.      Comments: Right arm pain, sling in place.   Skin:     General: Skin is warm.      Capillary Refill: Capillary refill takes less than 2 seconds.   Neurological:      General: No focal deficit present.      Mental Status: She is alert and oriented to person, place, and time.      Cranial Nerves: No cranial nerve deficit.   Psychiatric:         Mood and Affect: Mood normal.         Behavior: Behavior normal.         Fluids  No intake or output data in the 24 hours ending 06/01/20 1616    Laboratory  Recent Labs     05/31/20  1334 06/01/20  0257   WBC 9.7 12.7*   RBC 4.07* 4.16*   HEMOGLOBIN 13.2 13.5   HEMATOCRIT 40.5 41.1   MCV 99.5* 98.8*   MCH 32.4 32.5   MCHC 32.6* 32.8*   RDW 51.2* 49.7   PLATELETCT 184 215   MPV 10.2 9.9     Recent Labs     05/31/20  1334 06/01/20  0257   SODIUM 131* 133*   POTASSIUM 3.8 4.2   CHLORIDE 95* 96   CO2 21 23   GLUCOSE 163* 183*   BUN 10 9   CREATININE 0.49* 0.45*   CALCIUM 8.1* 8.4*     Recent Labs     05/31/20  1334 06/01/20  1234   INR 1.99* 1.76*               Imaging  CT-SHOULDER W/O PLUS RECONS RIGHT   Final Result      1.  Right  proximal humeral fracture involving the surgical neck and greater tuberosity      2.  No significant angulation or displacement      DX-SHOULDER 2+ RIGHT   Final Result      1.  Right proximal humeral metaphyseal fracture      2.  No other finding      [id number] VIEW   Final Result      Mild small and large bowel dilation that is most likely adynamic ileus      EC-ECHOCARDIOGRAM LTD W/O CONT   Final Result      OUTSIDE IMAGES-DX UPPER EXTREMITY, RIGHT   Final Result      OUTSIDE IMAGES-DX UPPER EXTREMITY, RIGHT   Final Result      OUTSIDE IMAGES-DX LOWER EXTREMITY, LEFT   Final Result           Assessment/Plan  Hyponatremia  Assessment & Plan  Mild, continue monitoring.    Closed fracture of proximal end of humerus  Assessment & Plan   right shoulder anterior dislocation with proximal right humerus fracture.  Orthopedic surgery consulted.  Orthopedic surgery recommended conservative management pain management and sling    Chronic systolic heart failure (HCC)  Assessment & Plan  She has history of chronic systolic heart failure and her last echocardiogram showed an EMR ejection fraction of 15%.  History of Bumex and consider spironolactone when blood pressure more stable    Atrial fibrillation (HCC)- (present on admission)  Assessment & Plan  She has a history of atrial fibrillation.  Continue outpatient digoxin.  Continue Coumadin, transition to metoprolol from Coreg for better heart rate control  Follow-up INR         VTE prophylaxis: Warfarin    Case and plan of care discussed with nurse staff  Case and plan of care discussed during disciplinary rounds  Case and plan of care discussed with patient and patient's daughter

## 2020-06-01 NOTE — THERAPY
"Occupational Therapy   Initial Evaluation     Patient Name: Jacqueline Cameron  Age:  84 y.o., Sex:  female  Medical Record #: [id number]  Today's Date: 6/1/2020     Precautions  Precautions: (P) Fall Risk, Non Weight Bearing Left Upper Extremity, Sling Left Upper Extremity    Assessment  Patient is 84 y.o. female with a diagnosis of ***.  Additional factors influencing patient status / progress: ***.      Plan    Recommend Occupational Therapy 3 times per week until therapy goals are met for the following treatments:  Adaptive Equipment, Neuro Re-Education / Balance, Self Care/Activities of Daily Living, Therapeutic Activities and Therapeutic Exercises.    Discharge recommendations:  Recommend post-acute placement for additional occupational therapy services prior to discharge home.      Subjective    \"Back in my day, they just put a cast on it\"     Objective       06/01/20 1028   Prior Living Situation   Prior Services None   Housing / Facility [address] [facility]   Steps Into Home 4   Bathroom Set up Bathtub / Shower Combination;Shower Chair   Equipment Owned Front-Wheel Walker   Lives with - Patient's Self Care Capacity Alone and Able to Care For Self   Comments Dtr lives next door and works during the day   Prior Level of ADL Function   Self Feeding Independent   Grooming / Hygiene Independent   Bathing Independent   Dressing Independent   Toileting Independent   Active ROM Upper Body   Comments L UE NT 2/2 shoulder fx   Coordination Upper Body   Comments Pt would not demo any coordination tasks w/ L UE, R UE WFL   Bed Mobility    Supine to Sit   (NT in chair post)   Sit to Supine Maximal Assist   Scooting Maximal Assist  (to scoot up in bed)   ADL Assessment   Grooming Supervision;Seated   Upper Body Dressing Maximal Assist  (sling)   Lower Body Dressing Maximal Assist  (socks)   Functional Mobility   Sit to Stand Moderate Assist  (from chair )   Bed, Chair, Wheelchair Transfer Moderate Assist   Transfer Method " Stand Step   Mobility chair to bed    Interdisciplinary Plan of Care Collaboration   Collaboration Comments Report given, RN present post

## 2020-06-01 NOTE — PROGRESS NOTES
Inpatient Anticoagulation Service Note    Date: 5/31/2020    Reason for Anticoagulation: Atrial Fibrillation   Target INR: 2.0 to 3.0  [id number] VASc Score: 5  HAS-BLED Score: 1   Hemoglobin Value: 13.2  Hematocrit Value: 40.5  Lab Platelet Value: 184    INR from last 7 days     Date/Time INR Value    05/31/20 1334  (!) 1.99        Dose from last 7 days     None        Bridge Therapy: No    Reversal Agent Administered: Not Applicable    Comments: Warfarin initially held due to possibility of surgery for right shoulder fracture. Per ortho consult, non-operative management at this time with outpatient follow up. Continue home warfarin for a-fib, home dosing regimen per phone call to patient's family member is 1.5 mg on M,W,F and 3 mg AOD. INR is slightly subtherapeutic at 1.99, will continue with home dosing reigmen and check INR x3 days.    Plan:  Continue home dosing regimen of warfarin 1.5 mg on M, W, F and 3 mg AOD    Education Material Provided?: No (chronic warfarin pt)    Pharmacist suggested discharge dosing: At this time it would be reasonable to continue home dosing regimen of warfarin 1.5 mg on M, W, F and 3 mg AOD with close f/u with anticoagulation clinic within 72 hours of discharge.     Sheree Navarro, PharmD

## 2020-06-01 NOTE — PROGRESS NOTES
Received bedside report from RN, pt care assumed, VSS. Pt asleep in bed, calm/unlabored breathing. No signs of acute distress noted at this time.On 2L NC, SpO2 98%. Bed in lowest position, call light within reach, hourly rounding initiated.

## 2020-06-01 NOTE — CARE PLAN
Problem: Knowledge Deficit  Goal: Knowledge of disease process/condition, treatment plan, diagnostic tests, and medications will improve  Outcome: PROGRESSING AS EXPECTED  Pt educated on POC for the day, disease process, upcoming tests, and medication information. Will continue to answer questions and educate pt as necessary.       Problem: Pain Management  Goal: Pain level will decrease to patient's comfort goal  Outcome: PROGRESSING AS EXPECTED     Will work with interdisciplinary team to manage pts pain. Pt educated on non-pharmacological methods to aid in pain. Follow up to both pharmacological and non-pharmacological methods done regularly.

## 2020-06-01 NOTE — PROGRESS NOTES
Day shift RN reported patient refused 2 RN skin check due to pain. Patient continuing to refuse skin check and mobilization due to pain. Pain medications administered.

## 2020-06-01 NOTE — PALLIATIVE CARE
Palliative Care follow-up  PC RN went to pt's bedside. Pt is asleep. Discussed with pt's BS RN who states that pt has been distraught and uncomfortable all day. Priscilla asks that PC RN return tomorrow to allow pt to rest.       Updated: Dr. Lawler and BS RN    Plan: go see pt for GOC tomorrow after she has had time to rest.     Thank you for allowing Palliative Care to support this patient and family. Contact [phone] for additional assistance, change in patient status, or with any questions/concerns.

## 2020-06-01 NOTE — CONSULTS
"Reason for PC Consult: Advance Care Planning    Consulted by: Dr. Lawler    Assessment:  General:   84 y.o. female with past medical history of atrial fibrillation currently on warfarin who presented to the hospital on 5/31/2020 as a transfer from outside facility. Pt arrives with complaint of fall, developed right shoulder pain. Found to have fracture and transferred to [facility] for higher level of care. Ortho has determined non operative management.     Dyspnea: No  Last BM: (PTA)  Pain: Yes  Depression: Mood appropriate for situation  Dementia: No    Spiritual:  Is Congregation or spirituality important for coping with this illness? No  Has a  or spiritual provider visit been requested? No    Palliative Performance Scale: 60%    Advance Directive: None-pt states her dtr Randee has copy at home  DPOA: No-pt states it is her dtr Randee  POLST: No-created at this encounter    Code Status: DNR/DNI    Social: Pt lives alone in [place]. Pt has been  since the age of 35. Pt has three children, Marilynn that lives in [place], June in [place], and a son in [place] that is estranged from the patient. Pt has a granddaughter Estrella that she helped raise who is a flight nurse. And a great granddaughter Cheryl which is a source of mercy for the patient, Cheryl is 12 years old and the pt sees her often.     Outcome:  Introduced self and role of Palliative Care.  Assessed pt's understanding of current medical status, overall health picture, and options for future care. Pt states that she fell and broke her right arm. Pt is in a lot of pain and feels sad about the injury, however she does feel glad that she didn't \"bump her head.\" Pt states that her pain has been difficult to manage and she feels that the transfer to [facility] was \"pointless.\" Pt states that she is independent at home. She uses a cane, she does not drive, and her dtr/granddaughter provide support. Pt has chronic pain and uses a pain clinic " "in [place] that she is pleased with. Pt gets her INR levels checked in [place] as well.     Explored pt's values, beliefs, and preferences in order to identify GOC. Pt's goal is going to rehab in [place] and returning to her home. Pt feels that she has enough support in her home to succeed at home. Pt's dtr June plans to come to [place] after patient has completed rehab and help her at home. PC RN explains hospice as future care option. PC RN describes the extra layer of support, symptom management, and focusing on quality of life. Pt verbalizes understanding. Pt's goal remains to DC to rehab and feels her symptoms of pain will be managed best by her pain doctor in [place].     Advance directive discussed, pt called her dtr Randee who stated that she has AD at home. Randee is in [place] and can email a copy to PC RN tomorrow, plan for PC RN to call Randee tomorrow. Discussed code status in detail including mechanical ventilation and what resuscitation looks like. Pt states that she would NOT want CPR or intubation. Code status changed to DNR/DNI. PC RN assists pt in completing POLST form. POLST reflects DNR, selective treatment, and artificial nutrition \"as long as I am improving.\" Pt states that she would not want tube feeding at end of life, but would use it as a bridge to get better.     Spiritual visit declined.     Active listening, reflection, reminiscing, validation & normalization, and empathic support utilized throughout this encounter.  All questions answered.  PC contact information given.         Updated: RANDELL Mtz and Dr. Dudley    Plan: DNR/DNI. Pt hopeful for DC to [facility].         Thank you for allowing Palliative Care to participate in this patient's care. Please feel free to call [phone] with any questions or concerns.  "

## 2020-06-01 NOTE — PROGRESS NOTES
Handoff report received. Assumed patient care. Patient refusing to work with physical therapy(PT). Sister is on the phone and PT and RN discuss the importance of ambulating and getting out of bed. Pt complains of excruciating pain and states the pain medications dont do anything for her. Pt educated on what medications that can be administered and she currently refused them.  10/10 pain. PT was able to work with patient and get patient into chair.  Patient not in distress, AAOX 4.Safety precautions in place. Call light and personal belongings within reach. Educated to call for assistance if needed.

## 2020-06-01 NOTE — CONSULTS
5/31/2020    Reason for consultation: Right proximal humerus fracture    Consultation on Jacqueline Wiley Rakesh at the request of Dr. Lawler for a right proximal humerus fracture.  The patient is a 84 y.o. female who presents with a Right proximal humerus fracture due to an level fall.  The patient noted immediate pain and inability to move the affected extremity due to pain.  She was evaluated at an outpatient facility and transferred to [facility] for presumed fracture dislocation of the right proximal humerus.  Radiographs were concerning for a minimally displaced proximal humerus fracture with some stable inferior subluxation, however; films were technically inadequate to evaluate for dislocation.  In the presence of a possible head split a CT scan was obtained.  Upon my evaluation the patient is extremely somnolent and barely arousable after receiving pain meds for CT scan and further history cannot be obtained except from the chart.    Past Medical History:   Diagnosis Date   • A-fib    • Congestive heart failure    • Diabetes    • MI (myocardial infarction)        Past Surgical History:   Procedure Laterality Date   • OTHER CARDIAC SURGERY         Medications  No current facility-administered medications on file prior to encounter.      Current Outpatient Medications on File Prior to Encounter   Medication Sig Dispense Refill   • oxyCODONE immediate-release (ROXICODONE) 5 MG Tab Take 10 mg by mouth every four hours as needed for Severe Pain.     • bumetanide (BUMEX) 1 MG Tab Take 1 mg by mouth 2 times a day.     • carvedilol (COREG) 3.125 MG Tab Take 3.125 mg by mouth every day.     • sacubitril-valsartan (ENTRESTO) 24-26 MG Tab tablet Take 1 Tab by mouth every day.     • warfarin (COUMADIN) 3 MG Tab Take 3 mg by mouth every day. TAKE 1 TABLET BY MOUTH ON TUES, THURS, SAT, SUN. THEN TAKE ONE-HALF TABLET MON, WED, FRI.     • alendronate (FOSAMAX) 70 MG Tab Take 70 mg by mouth every 7 days. EVERY SUNDAY     •  pregabalin (LYRICA) 75 MG Cap Take 1 Cap by mouth 2 Times a Day. (Patient taking differently: Take 50 mg by mouth 3 times a day.) 60 Cap 11   • digoxin (LANOXIN) 125 MCG Tab Take 1 Tab by mouth every day at 6 PM. 30 Tab 3   • senna-docusate (PERICOLACE OR SENOKOT S) 8.6-50 MG Tab Take 1 Tab by mouth every day.     • potassium chloride SA (K-DUR) 10 MEQ Tab CR Take 10 mEq by mouth 2 times a day.         Allergies  Patient has no known allergies.  Cannot be obtained due to her somnolence    ROS  Cannot be obtained due to her somnolence    No family history on file.  Cannot be obtained due to her somnolence    Social History     Socioeconomic History   • Marital status:      Spouse name: Not on file   • Number of children: Not on file   • Years of education: Not on file   • Highest education level: Not on file   Occupational History   • Not on file   Social Needs   • Financial resource strain: Not on file   • Food insecurity     Worry: Not on file     Inability: Not on file   • Transportation needs     Medical: Not on file     Non-medical: Not on file   Tobacco Use   • Smoking status: Never Smoker   Substance and Sexual Activity   • Alcohol use: No   • Drug use: No   • Sexual activity: Not on file   Lifestyle   • Physical activity     Days per week: Not on file     Minutes per session: Not on file   • Stress: Not on file   Relationships   • Social connections     Talks on phone: Not on file     Gets together: Not on file     Attends Orthodox service: Not on file     Active member of club or organization: Not on file     Attends meetings of clubs or organizations: Not on file     Relationship status: Not on file   • Intimate partner violence     Fear of current or ex partner: Not on file     Emotionally abused: Not on file     Physically abused: Not on file     Forced sexual activity: Not on file   Other Topics Concern   • Not on file   Social History Narrative   • Not on file   Cannot be obtained due to her  somnolence    Physical Exam  Vitals  /78   Pulse (!) 112   Temp 37 °C (98.6 °F) (Temporal)   Resp 18   Wt 68.5 kg (151 lb 0.2 oz)   SpO2 94%   General: Very somnolent, no distress  Psychiatric: Very somnolent, cannot be assessed  HEENT: Normocephalic, atraumatic  Eyes: Anicteric, PERRL   Neck: Midline trachea, supple  Chest: Symmetric expansion of the chest wall, no distress.  Heart: RRR, palpable peripheral pulses  Abdomen: Soft, NT, ND  Skin: Intact, no open wounds  Extremities: Swelling of right shoulder  Neuro: Cannot be assessed due to somnolence  Vascular: 2+ radial on right, Capillary refill <2 seconds    Radiographs:  CT-SHOULDER W/O PLUS RECONS RIGHT   Final Result      1.  Right proximal humeral fracture involving the surgical neck and greater tuberosity      2.  No significant angulation or displacement      DX-SHOULDER 2+ RIGHT   Final Result      1.  Right proximal humeral metaphyseal fracture      2.  No other finding      [id number] VIEW   Final Result      Mild small and large bowel dilation that is most likely adynamic ileus      EC-ECHOCARDIOGRAM LTD W/O CONT   Final Result      OUTSIDE IMAGES-DX UPPER EXTREMITY, RIGHT   Final Result      OUTSIDE IMAGES-DX UPPER EXTREMITY, RIGHT   Final Result      OUTSIDE IMAGES-DX LOWER EXTREMITY, LEFT   Final Result          Laboratory Values  Recent Labs     05/31/20  1334   WBC 9.7   RBC 4.07*   HEMOGLOBIN 13.2   HEMATOCRIT 40.5   MCV 99.5*   MCH 32.4   MCHC 32.6*   RDW 51.2*   PLATELETCT 184   MPV 10.2     Recent Labs     05/31/20  1334   SODIUM 131*   POTASSIUM 3.8   CHLORIDE 95*   CO2 21   GLUCOSE 163*   BUN 10     Recent Labs     05/31/20  1334   INR 1.99*         Impression:    #1 Right minimally displaced proximal humerus fracture    Plan:    Non-operative management.  Sling or shoulder immobilizer at all times except for hygiene.  Follow up at [facility] in 2 weeks.

## 2020-06-01 NOTE — THERAPY
Physical Therapy   Initial Evaluation     Patient Name: Jacqueline Cameron  Age:  84 y.o., Sex:  female  Medical Record #: [id number]  Today's Date: 6/1/2020     Precautions: Fall Risk                 Assessment    Pt admitted s/p fall, sustaining a left proximal humerus fx.  Non surgical.  In a sling.  Assume NWB.  Pt llives alone and was mobile w/ a fww prn.  Her daughter lives next door.  Pt reports sleeping in her recliner.  PT will not address bed mobility, but work towards improved transfers and gait.  She is significantly limited due to pain and fear of pain.        Plan    Recommend Physical Therapy 4 times per week until therapy goals are met for the following treatments:  Gait Training, Stair Training and Therapeutic Activities    Discharge recommendations:  Recommend post-acute placement for continued physical therapy services prior to discharge home.          06/01/20 0941   Prior Living Situation   Housing / Facility 1 Story House   Steps Into Home 4   Steps In Home 0   Rail Both Rail (Steps into Home)   Equipment Owned Front-Wheel Walker   Lives with - Patient's Self Care Capacity Alone and Able to Care For Self   Comments Daughter lives next door   Prior Level of Functional Mobility   Bed Mobility Independent   Transfer Status Independent   Ambulation Independent   Assistive Devices Used Front-Wheel Walker   Stairs Independent   History of Falls   Date of Last Fall 05/31/20   Gait Analysis   Gait Level Of Assist Minimal Assist   Assistive Device Hand Held Assist   Distance (Feet) 10   Deviation Bradykinetic;Shuffled Gait  (trunk flexion t/o)   Weight Bearing Status RUE in sling, assume NWB   Skilled Intervention Verbal Cuing;Tactile Cuing;Sequencing;Facilitation   Bed Mobility    Supine to Sit Maximal Assist   Skilled Intervention Verbal Cuing;Tactile Cuing;Sequencing;Facilitation   Functional Mobility   Sit to Stand Minimal Assist   Bed, Chair, Wheelchair Transfer Minimal Assist   Skilled  Intervention Verbal Cuing;Tactile Cuing;Sequencing;Facilitation   Short Term Goals    Short Term Goal # 1 Sit to/from stand w/ spv in 6 visits to initiate gait   Short Term Goal # 2 Ambulate 150 ft w/ spv in 6 visits to negotiate her home   Short Term Goal # 3 Up/down 4 steps w/ spv in 6 visits to access home   Anticipated Discharge Equipment   DC Equipment Unable To Determine At This Time

## 2020-06-01 NOTE — CARE PLAN
Problem: Safety  Goal: Will remain free from injury  Outcome: PROGRESSING AS EXPECTED  Goal: Will remain free from falls  Outcome: PROGRESSING AS EXPECTED     Problem: Pain Management  Goal: Pain level will decrease to patient's comfort goal  Outcome: PROGRESSING SLOWER THAN EXPECTED     Problem: Psychosocial Needs:  Goal: Level of anxiety will decrease  Outcome: PROGRESSING SLOWER THAN EXPECTED

## 2020-06-01 NOTE — DISCHARGE PLANNING
Received Choice form at 6141  Agency/Facility Name: [facility]  Referral sent per Choice form @ [id number]

## 2020-06-01 NOTE — DISCHARGE PLANNING
Care Transition Team Assessment    RN MARA spoke with patient's daughter to complete transition assessment.  Per daughter, patient lives at home alone, but requires help with ADLs/IADLs.  The level of help required depends on what type of day the patient is having.  She has both a cane and a walker that she uses consistently in the home.  If she has to leave the house, they have a transport wheelchair they use for the patient as she can not walk distances very far.  Per daughter, patient does not have 24/7 help and so discharging home is not an option.  Daughter gave preference for referral to be made to [facility] for Swing Bed.  PCF form completed and sent to SUNIL Price.  Per daughter, patient's granddaughter will transport her on discharge as the family is aware that ambulance transport would come out of their pocket.      Patient sees Lizzette Reeder @ [facility] as her PCP and then Dr. Glover @ [facility] for her pain management MD.      Daughter denies any other needs at this time.  She inquired as to whether or not she needs to do anything re: the referral to [facility].  Stated that she had already talked to the staff over there and they have a bed available for the patient.  Told her that she did not need to do anything further, that we would orchestrate getting the needed information over to them.    DC Plan:  [facility], [place] for granddaughter to transport her.    Information Source  Orientation : Oriented x 4  Information Given By: Relative  Informant's Name: Randee, daughter  Who is responsible for making decisions for patient? : Patient    Readmission Evaluation  Is this a readmission?: No    Elopement Risk  Legal Hold: No  Ambulatory or Self Mobile in Wheelchair: No-Not an Elopement Risk  Elopement Risk: Not at Risk for Elopement    Interdisciplinary Discharge Planning  Does Admitting Nurse Feel This Could be a Complex Discharge?: Yes  Primary Care Physician: Lizzette  Lilli HEDRICK and Dr. Glover  Lives with - Patient's Self Care Capacity: Alone and Able to Care For Self  Support Systems: Children  Housing / Facility: [address] [place] [address]  Do You Take your Prescribed Medications Regularly: Yes  Able to Return to Previous ADL's: Future Time w/Therapy  Mobility Issues: No  Prior Services: None  Patient Expects to be Discharged to:: Swing Bed @ [place]  Assistance Needed: Yes  Durable Medical Equipment: Walker(Cane, Wheelchair)    Discharge Preparedness  What is your plan after discharge?: Skilled nursing facility  What are your discharge supports?: Child  Prior Functional Level: Needs Assist with Activities of Daily Living, Needs Assist with Medication Management, Uses Cane, Uses Walker, Uses Wheelchair  Difficulity with ADLs: Walking  Difficulty with ADLs Comment: Per daughter, patient uses a cane and walker in the home, but when they take her out of the home, they use a WC b/c patient has poor endurance.    Functional Assesment  Prior Functional Level: Needs Assist with Activities of Daily Living, Needs Assist with Medication Management, Uses Cane, Uses Walker, Uses Wheelchair    Finances  Financial Barriers to Discharge: No  Prescription Coverage: Yes    Vision / Hearing Impairment  Vision Impairment : No  Hearing Impairment : No         Advance Directive  Advance Directive?: POLST    Domestic Abuse  Have you ever been the victim of abuse or violence?: No  Physical Abuse or Sexual Abuse: No  Verbal Abuse or Emotional Abuse: No  Possible Abuse Reported to:: Not Applicable    Psychological Assessment  History of Substance Abuse: Alcohol  Substance Abuse Comments: per daughter has not had alcohol in 20+ years  History of Psychiatric Problems: No  Non-compliant with Treatment: No    Discharge Risks or Barriers  Discharge risks or barriers?: No    Anticipated Discharge Information  Anticipated discharge disposition: SNF

## 2020-06-01 NOTE — PROGRESS NOTES
"Patient is complaining of extreme pain in shoulder, morphine IV administered. This RN suggested repositioning patient in bed and patient agreed, but later refused to move. Attempted to only readjust right arm in sling per doctor's orders, very reluctant to move arm, educated on importance of wearing sling. Able to readjust right arm into sling with assistance from RN staff and reluctance from patient. Pt still inconsolable, this RN attempted to provide education that patient will still have some pain despite receiving medications due to acute injury, patient continuing to scream \"you don't understand why aren't you helping me I am in pain\". PO Norco administered.  "

## 2020-06-01 NOTE — PROGRESS NOTES
Inpatient Anticoagulation Service Note    Date: 6/1/2020    Reason for Anticoagulation: Atrial Fibrillation   Target INR: 2.0 to 3.0  [id number] VASc Score: 5  HAS-BLED Score: 1   Hemoglobin Value: 13.5  Hematocrit Value: 41.1  Lab Platelet Value: 215    INR from last 7 days     Date/Time INR Value    06/01/20 1234  (!) 1.76    05/31/20 1334  (!) 1.99        Dose from last 7 days     Date/Time Dose (mg)    06/01/20 1416  3        Average Dose (mg): (MWF 1.5mg and all other days 3mg)  Significant Interactions: Not Applicable  Bridge Therapy: No    Reversal Agent Administered: Not Applicable  Comments: Home warfarin for afib.  INR decreased overnight after resuming home regimen.  No s/sx of bleeding noted in chart review. No new DDI.  Will repeat 3mg tonight and assess INR trend in AM.    Plan:  Warfarin 3mg. INR in AM.  Education Material Provided?: No(chronic)  Pharmacist suggested discharge dosing: Resume home regimen of MWF 1.5mg and all other days 3mg with follow up within 5 days of discharge.     Ralph Santiago, PharmD

## 2020-06-02 PROBLEM — E11.42 TYPE 2 DIABETES MELLITUS WITH DIABETIC POLYNEUROPATHY, WITHOUT LONG-TERM CURRENT USE OF INSULIN (HCC): Status: ACTIVE | Noted: 2020-06-02

## 2020-06-02 LAB
ANION GAP SERPL CALC-SCNC: 12 MMOL/L (ref 7–16)
BASOPHILS # BLD AUTO: 0.2 % (ref 0–1.8)
BASOPHILS # BLD: 0.02 K/UL (ref 0–0.12)
BUN SERPL-MCNC: 11 MG/DL (ref 8–22)
CALCIUM SERPL-MCNC: 8.4 MG/DL (ref 8.5–10.5)
CHLORIDE SERPL-SCNC: 97 MMOL/L (ref 96–112)
CO2 SERPL-SCNC: 25 MMOL/L (ref 20–33)
CREAT SERPL-MCNC: 0.42 MG/DL (ref 0.5–1.4)
EOSINOPHIL # BLD AUTO: 0.04 K/UL (ref 0–0.51)
EOSINOPHIL NFR BLD: 0.4 % (ref 0–6.9)
ERYTHROCYTE [DISTWIDTH] IN BLOOD BY AUTOMATED COUNT: 49.1 FL (ref 35.9–50)
GLUCOSE SERPL-MCNC: 109 MG/DL (ref 65–99)
HCT VFR BLD AUTO: 40.3 % (ref 37–47)
HGB BLD-MCNC: 13.2 G/DL (ref 12–16)
IMM GRANULOCYTES # BLD AUTO: 0.06 K/UL (ref 0–0.11)
IMM GRANULOCYTES NFR BLD AUTO: 0.6 % (ref 0–0.9)
INR PPP: 1.8 (ref 0.87–1.13)
LYMPHOCYTES # BLD AUTO: 1.53 K/UL (ref 1–4.8)
LYMPHOCYTES NFR BLD: 14.1 % (ref 22–41)
MCH RBC QN AUTO: 32.2 PG (ref 27–33)
MCHC RBC AUTO-ENTMCNC: 32.8 G/DL (ref 33.6–35)
MCV RBC AUTO: 98.3 FL (ref 81.4–97.8)
MONOCYTES # BLD AUTO: 1.29 K/UL (ref 0–0.85)
MONOCYTES NFR BLD AUTO: 11.9 % (ref 0–13.4)
NEUTROPHILS # BLD AUTO: 7.93 K/UL (ref 2–7.15)
NEUTROPHILS NFR BLD: 72.8 % (ref 44–72)
NRBC # BLD AUTO: 0 K/UL
NRBC BLD-RTO: 0 /100 WBC
PLATELET # BLD AUTO: 226 K/UL (ref 164–446)
PMV BLD AUTO: 10.3 FL (ref 9–12.9)
POTASSIUM SERPL-SCNC: 3.9 MMOL/L (ref 3.6–5.5)
PROTHROMBIN TIME: 21.4 SEC (ref 12–14.6)
RBC # BLD AUTO: 4.1 M/UL (ref 4.2–5.4)
SODIUM SERPL-SCNC: 134 MMOL/L (ref 135–145)
WBC # BLD AUTO: 10.9 K/UL (ref 4.8–10.8)

## 2020-06-02 PROCEDURE — 700102 HCHG RX REV CODE 250 W/ 637 OVERRIDE(OP): Performed by: INTERNAL MEDICINE

## 2020-06-02 PROCEDURE — 770020 HCHG ROOM/CARE - TELE (206)

## 2020-06-02 PROCEDURE — 85610 PROTHROMBIN TIME: CPT

## 2020-06-02 PROCEDURE — 99232 SBSQ HOSP IP/OBS MODERATE 35: CPT | Performed by: INTERNAL MEDICINE

## 2020-06-02 PROCEDURE — 700102 HCHG RX REV CODE 250 W/ 637 OVERRIDE(OP): Performed by: HOSPITALIST

## 2020-06-02 PROCEDURE — 700101 HCHG RX REV CODE 250: Performed by: INTERNAL MEDICINE

## 2020-06-02 PROCEDURE — 36415 COLL VENOUS BLD VENIPUNCTURE: CPT

## 2020-06-02 PROCEDURE — A9270 NON-COVERED ITEM OR SERVICE: HCPCS | Performed by: INTERNAL MEDICINE

## 2020-06-02 PROCEDURE — 80048 BASIC METABOLIC PNL TOTAL CA: CPT

## 2020-06-02 PROCEDURE — A9270 NON-COVERED ITEM OR SERVICE: HCPCS | Performed by: HOSPITALIST

## 2020-06-02 PROCEDURE — 700111 HCHG RX REV CODE 636 W/ 250 OVERRIDE (IP): Performed by: INTERNAL MEDICINE

## 2020-06-02 PROCEDURE — 85025 COMPLETE CBC W/AUTO DIFF WBC: CPT

## 2020-06-02 RX ORDER — OXYCODONE HYDROCHLORIDE 5 MG/1
5-10 TABLET ORAL
Status: DISCONTINUED | OUTPATIENT
Start: 2020-06-02 | End: 2020-06-03 | Stop reason: HOSPADM

## 2020-06-02 RX ORDER — LIDOCAINE 50 MG/G
1 PATCH TOPICAL EVERY 24 HOURS
Status: DISCONTINUED | OUTPATIENT
Start: 2020-06-02 | End: 2020-06-03 | Stop reason: HOSPADM

## 2020-06-02 RX ORDER — WARFARIN SODIUM 3 MG/1
3 TABLET ORAL
Status: DISCONTINUED | OUTPATIENT
Start: 2020-06-03 | End: 2020-06-03

## 2020-06-02 RX ORDER — WARFARIN SODIUM 5 MG/1
5 TABLET ORAL
Status: COMPLETED | OUTPATIENT
Start: 2020-06-02 | End: 2020-06-02

## 2020-06-02 RX ORDER — WARFARIN SODIUM 3 MG/1
1.5 TABLET ORAL
Status: DISCONTINUED | OUTPATIENT
Start: 2020-06-03 | End: 2020-06-03

## 2020-06-02 RX ADMIN — OXYCODONE 10 MG: 5 TABLET ORAL at 15:17

## 2020-06-02 RX ADMIN — METOPROLOL TARTRATE 25 MG: 25 TABLET, FILM COATED ORAL at 18:26

## 2020-06-02 RX ADMIN — OXYCODONE 10 MG: 5 TABLET ORAL at 21:32

## 2020-06-02 RX ADMIN — PREGABALIN 75 MG: 75 CAPSULE ORAL at 18:26

## 2020-06-02 RX ADMIN — DOCUSATE SODIUM 50 MG AND SENNOSIDES 8.6 MG 2 TABLET: 8.6; 5 TABLET, FILM COATED ORAL at 05:27

## 2020-06-02 RX ADMIN — OXYCODONE 10 MG: 5 TABLET ORAL at 11:35

## 2020-06-02 RX ADMIN — HYDROCODONE BITARTRATE AND ACETAMINOPHEN 2 TABLET: 5; 325 TABLET ORAL at 03:53

## 2020-06-02 RX ADMIN — METOPROLOL TARTRATE 25 MG: 25 TABLET, FILM COATED ORAL at 05:27

## 2020-06-02 RX ADMIN — MORPHINE SULFATE 2 MG: 4 INJECTION INTRAVENOUS at 18:27

## 2020-06-02 RX ADMIN — PREGABALIN 75 MG: 75 CAPSULE ORAL at 05:28

## 2020-06-02 RX ADMIN — WARFARIN SODIUM 5 MG: 5 TABLET ORAL at 18:44

## 2020-06-02 RX ADMIN — SACUBITRIL AND VALSARTAN 1 TABLET: 24; 26 TABLET, FILM COATED ORAL at 05:47

## 2020-06-02 RX ADMIN — LIDOCAINE 1 PATCH: 50 PATCH TOPICAL at 10:06

## 2020-06-02 RX ADMIN — MORPHINE SULFATE 2 MG: 4 INJECTION INTRAVENOUS at 05:27

## 2020-06-02 RX ADMIN — DIGOXIN 125 MCG: 125 TABLET ORAL at 18:26

## 2020-06-02 ASSESSMENT — ENCOUNTER SYMPTOMS
VOMITING: 0
FEVER: 0
COUGH: 0
ABDOMINAL PAIN: 0
DIZZINESS: 0
BACK PAIN: 1
PALPITATIONS: 0
HEADACHES: 0
NAUSEA: 0
SHORTNESS OF BREATH: 0
MYALGIAS: 1

## 2020-06-02 ASSESSMENT — LIFESTYLE VARIABLES
HAVE YOU EVER FELT YOU SHOULD CUT DOWN ON YOUR DRINKING: NO
CONSUMPTION TOTAL: NEGATIVE
TOTAL SCORE: 0
ALCOHOL_USE: NO
HOW MANY TIMES IN THE PAST YEAR HAVE YOU HAD 5 OR MORE DRINKS IN A DAY: 0
TOTAL SCORE: 0
TOTAL SCORE: 0
AVERAGE NUMBER OF DAYS PER WEEK YOU HAVE A DRINK CONTAINING ALCOHOL: 0
EVER FELT BAD OR GUILTY ABOUT YOUR DRINKING: NO
ON A TYPICAL DAY WHEN YOU DRINK ALCOHOL HOW MANY DRINKS DO YOU HAVE: 0
EVER HAD A DRINK FIRST THING IN THE MORNING TO STEADY YOUR NERVES TO GET RID OF A HANGOVER: NO
HAVE PEOPLE ANNOYED YOU BY CRITICIZING YOUR DRINKING: NO

## 2020-06-02 ASSESSMENT — FIBROSIS 4 INDEX: FIB4 SCORE: 2.06

## 2020-06-02 NOTE — DISCHARGE PLANNING
Anticipated Discharge Disposition: [facility]    Action: Called [facility] and spoke with Jackie who stated that she is waiting on PT to clear the patient at her facility.  Once she has an answer from her PT, she will call back and let us know if they can accept the patient or not.  Called and updated patient's daughter.  Daughter stated that she was going to come up to her mom's room @ 3:00pm and would wait in her mom's room with her until time for patient to discharge.    Barriers to Discharge: Waiting on facility acceptance.    Plan: RN to follow up with [facility] and staff @ [facility] on status of referral.    1535--Spoke with Jackie @ [facility] who states that she does not have an MD to do admission until tomorrow am.  Patient notified by ELEONORA Putnam.  Per Indy, patient and daughter had both already been told by Dr. Kay that it would be tomorrow before they would be able to discharge.  CN notified as well.

## 2020-06-02 NOTE — CARE PLAN
Problem: Communication  Goal: The ability to communicate needs accurately and effectively will improve  Outcome: PROGRESSING AS EXPECTED  Note: Pt calls appropriately      Problem: Venous Thromboembolism (VTW)/Deep Vein Thrombosis (DVT) Prevention:  Goal: Patient will participate in Venous Thrombosis (VTE)/Deep Vein Thrombosis (DVT)Prevention Measures  Outcome: PROGRESSING AS EXPECTED  Flowsheets (Taken 6/2/2020 7747)  Pharmacologic Prophylaxis Used: Warfarin (Coumadin)     Problem: Bowel/Gastric:  Goal: Normal bowel function is maintained or improved  Outcome: PROGRESSING AS EXPECTED  Note: BM on 6/1     Problem: Pain Management  Goal: Pain level will decrease to patient's comfort goal  Outcome: PROGRESSING SLOWER THAN EXPECTED  Note: Pain management per MAR. Pt has chronic pain     Problem: Psychosocial Needs:  Goal: Level of anxiety will decrease  Outcome: PROGRESSING SLOWER THAN EXPECTED  Note: Pt is tearful due to pain. See MAR for pain management.

## 2020-06-02 NOTE — PROGRESS NOTES
Report received at bedside from NOC RN, pt care assumed, tele box on. Pt aaox4.  POC discussed with pt. Pt has chronic pain which is being managed per MAR.  Bed in lowest position, pt educated on fall risk and verbalized understanding, call light within reach, bed alarm plugged in and on.

## 2020-06-02 NOTE — PROGRESS NOTES
Bedside report received from nurse. Assumed care of pt. Pt resting comfortably in bed. A/Ox4, VSS,  All needs met. POC reviewed and white board updated. Tele box on. Call light in reach. Bed locked in lowest position with 2 upper bed rails up. No pain or complaints

## 2020-06-02 NOTE — CARE PLAN
Problem: Communication  Goal: The ability to communicate needs accurately and effectively will improve  Outcome: PROGRESSING AS EXPECTED     Problem: Safety  Goal: Will remain free from injury  Outcome: PROGRESSING AS EXPECTED  Goal: Will remain free from falls  Outcome: PROGRESSING AS EXPECTED     Problem: Knowledge Deficit  Goal: Knowledge of disease process/condition, treatment plan, diagnostic tests, and medications will improve  Outcome: PROGRESSING AS EXPECTED     Problem: Pain Management  Goal: Pain level will decrease to patient's comfort goal  Outcome: PROGRESSING AS EXPECTED  Note: Pt is receiving iv morphine and norco 10mg for pain.     Problem: Mobility  Goal: Risk for activity intolerance will decrease  Outcome: PROGRESSING SLOWER THAN EXPECTED  Note: Pt is refusing turning and any movement by staff at this time d/t pain.

## 2020-06-02 NOTE — PROGRESS NOTES
Inpatient Anticoagulation Service Note    Date: 6/2/2020    Reason for Anticoagulation: Atrial Fibrillation   Target INR: 2.0 to 3.0  [id number] VASc Score: 5  HAS-BLED Score: 1   Hemoglobin Value: 13.2  Hematocrit Value: 40.3  Lab Platelet Value: 226    INR from last 7 days     Date/Time INR Value    06/02/20 0319  (!) 1.8    06/01/20 1234  (!) 1.76    05/31/20 1334  (!) 1.99        Dose from last 7 days     Date/Time Dose (mg)    06/02/20 1120  5    06/01/20 1416  3    05/31/20 2056  3        Average Dose (mg): (MWF 1.5mg and all other days 3mg)  Significant Interactions: Not Applicable  Bridge Therapy: No    Reversal Agent Administered: Not Applicable  Comments: Home warfarin from Afib continued.  INR stable overnight, no improvment towards goal INR.  No new DDI. Doses administered per MAR.  Will give one time increased dose tonight and resume home regimen thereafter.    Plan:  Warfarin 5mg. INR in AM.  Education Material Provided?: No(chronic)  Pharmacist suggested discharge dosing: Resume home regimen of MWF 1.5mg and all other days 3mg with follow up within 5 days of discharge.     Ralph Santiago, PharmD

## 2020-06-02 NOTE — PROGRESS NOTES
[facility] Medicine Daily Progress Note    Date of Service  6/2/2020    Chief Complaint  84 y.o. female admitted 5/31/2020 with fall.    Hospital Course    PMH afib, DM, peripheral neuropathy, chronic pain, CHF, CAD who presented with fall and right shoulder pain. Dr. Arrieta with ortho was consulted, recommended non-op management, sling immobilizer and f/u with ÁNGEL in 2 weeks. TTE was done which showed improvement of EF to 55%. She was in afib, she was changed from coreg to metoprolol and continued on coumadin.      Interval Problem Update  Remains in afib  She has chronic pain, followed by pain clinic in [place].  Complains of left knee pain, but mostly has right shoulder pain.    Consultants/Specialty  Ortho    Code Status  DNAR/DNI    Disposition  [facility] [place] pending    Review of Systems  Review of Systems   Constitutional: Negative for fever and malaise/fatigue.   HENT: Negative for congestion.    Respiratory: Negative for cough and shortness of breath.    Cardiovascular: Negative for chest pain and palpitations.   Gastrointestinal: Negative for abdominal pain, nausea and vomiting.   Genitourinary: Negative for dysuria.   Musculoskeletal: Positive for back pain, joint pain and myalgias.   Neurological: Negative for dizziness and headaches.        Physical Exam  Temp:  [36.3 °C (97.4 °F)-37.1 °C (98.8 °F)] 36.5 °C (97.7 °F)  Pulse:  [] 94  Resp:  [16-18] 18  BP: ()/(49-88) 120/69  SpO2:  [90 %-97 %] 97 %    Physical Exam  Vitals signs and nursing note reviewed.   Constitutional:       General: She is not in acute distress.     Appearance: She is not toxic-appearing or diaphoretic.   HENT:      Head: Normocephalic.      Mouth/Throat:      Mouth: Mucous membranes are moist.   Eyes:      General:         Right eye: No discharge.         Left eye: No discharge.   Neck:      Musculoskeletal: Neck supple.   Cardiovascular:      Rate and Rhythm: Tachycardia present. Rhythm irregular.   Pulmonary:       Effort: Pulmonary effort is normal.      Breath sounds: No wheezing or rales.   Abdominal:      General: There is distension.      Palpations: Abdomen is soft. There is no mass.      Tenderness: There is no abdominal tenderness. There is no guarding or rebound.   Musculoskeletal:         General: Swelling present.      Comments: Right shoulder tender, in sling  Left knee with ecchymosis and edema, tender   Skin:     General: Skin is warm and dry.   Neurological:      Mental Status: She is alert and oriented to person, place, and time.   Psychiatric:         Behavior: Behavior normal.         Fluids    Intake/Output Summary (Last 24 hours) at 6/2/2020 1316  Last data filed at 6/2/2020 0501  Gross per 24 hour   Intake --   Output 3150 ml   Net -3150 ml       Laboratory  Recent Labs     05/31/20  1334 06/01/20  0257 06/02/20  0319   WBC 9.7 12.7* 10.9*   RBC 4.07* 4.16* 4.10*   HEMOGLOBIN 13.2 13.5 13.2   HEMATOCRIT 40.5 41.1 40.3   MCV 99.5* 98.8* 98.3*   MCH 32.4 32.5 32.2   MCHC 32.6* 32.8* 32.8*   RDW 51.2* 49.7 49.1   PLATELETCT 184 215 226   MPV 10.2 9.9 10.3     Recent Labs     05/31/20  1334 06/01/20  0257 06/02/20  0319   SODIUM 131* 133* 134*   POTASSIUM 3.8 4.2 3.9   CHLORIDE 95* 96 97   CO2 21 23 25   GLUCOSE 163* 183* 109*   BUN 10 9 11   CREATININE 0.49* 0.45* 0.42*   CALCIUM 8.1* 8.4* 8.4*     Recent Labs     05/31/20  1334 06/01/20  1234 06/02/20  0319   INR 1.99* 1.76* 1.80*               Imaging  CT-SHOULDER W/O PLUS RECONS RIGHT   Final Result      1.  Right proximal humeral fracture involving the surgical neck and greater tuberosity      2.  No significant angulation or displacement      DX-SHOULDER 2+ RIGHT   Final Result      1.  Right proximal humeral metaphyseal fracture      2.  No other finding      QC-DRTSEIK-2 VIEW   Final Result      Mild small and large bowel dilation that is most likely adynamic ileus      EC-ECHOCARDIOGRAM LTD W/O CONT   Final Result      OUTSIDE IMAGES-DX UPPER  EXTREMITY, RIGHT   Final Result      OUTSIDE IMAGES-DX UPPER EXTREMITY, RIGHT   Final Result      OUTSIDE IMAGES-DX LOWER EXTREMITY, LEFT   Final Result           Assessment/Plan  * Closed fracture of proximal end of humerus  Assessment & Plan   right shoulder anterior dislocation with proximal right humerus fracture.  Orthopedic surgery consulted.  Orthopedic surgery recommended conservative management pain management and sling  PTOT  Lidocaine and PRN oxycodone. Avoiding NSAIDs given warfarin and tylenol, patient reports liver disease    Type 2 diabetes mellitus with diabetic polyneuropathy, without long-term current use of insulin (HCC)  Assessment & Plan  Diet controlled  Glucose has been in normal range  Cont kalpana for neuropathy    Hyponatremia  Assessment & Plan  Mild, continue monitoring.    Chronic systolic heart failure ([facility])  Assessment & Plan  She has history of chronic systolic heart failure and her last echocardiogram showed an EMR ejection fraction of 15%.  Hold  Bumex and consider spironolactone   TTE shows EF improved to 55%  Euvolemic    Atrial fibrillation (HCC)- (present on admission)  Assessment & Plan  She has a history of atrial fibrillation.  Continue outpatient digoxin.  Continue Coumadin, transition to metoprolol from Coreg for better heart rate control  Follow-up INR, goal 2-3       VTE prophylaxis: warfarin

## 2020-06-02 NOTE — DISCHARGE PLANNING
@[id number]  Agency/Facility Name: [facility]  Spoke To: Hope  Outcome: Resent referral to alternate fax [phone].    @[phone]  Agency/Facility Name: [facility]  Outcome: Left message, awaiting call back.

## 2020-06-03 ENCOUNTER — PATIENT OUTREACH (OUTPATIENT)
Dept: HEALTH INFORMATION MANAGEMENT | Facility: OTHER | Age: 85
End: 2020-06-03

## 2020-06-03 VITALS
HEART RATE: 103 BPM | DIASTOLIC BLOOD PRESSURE: 68 MMHG | RESPIRATION RATE: 18 BRPM | BODY MASS INDEX: 23.25 KG/M2 | WEIGHT: 143.96 LBS | TEMPERATURE: 98.6 F | SYSTOLIC BLOOD PRESSURE: 119 MMHG | OXYGEN SATURATION: 90 %
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
## 2020-06-03 LAB
INR PPP: 1.96 (ref 0.87–1.13)
PROTHROMBIN TIME: 23 SEC (ref 12–14.6)

## 2020-06-03 PROCEDURE — A9270 NON-COVERED ITEM OR SERVICE: HCPCS | Performed by: INTERNAL MEDICINE

## 2020-06-03 PROCEDURE — 700102 HCHG RX REV CODE 250 W/ 637 OVERRIDE(OP): Performed by: INTERNAL MEDICINE

## 2020-06-03 PROCEDURE — 36415 COLL VENOUS BLD VENIPUNCTURE: CPT

## 2020-06-03 PROCEDURE — 85610 PROTHROMBIN TIME: CPT

## 2020-06-03 PROCEDURE — 700111 HCHG RX REV CODE 636 W/ 250 OVERRIDE (IP): Performed by: INTERNAL MEDICINE

## 2020-06-03 PROCEDURE — 99239 HOSP IP/OBS DSCHRG MGMT >30: CPT | Performed by: INTERNAL MEDICINE

## 2020-06-03 PROCEDURE — 93005 ELECTROCARDIOGRAM TRACING: CPT

## 2020-06-03 PROCEDURE — 700102 HCHG RX REV CODE 250 W/ 637 OVERRIDE(OP): Performed by: HOSPITALIST

## 2020-06-03 PROCEDURE — A9270 NON-COVERED ITEM OR SERVICE: HCPCS | Performed by: HOSPITALIST

## 2020-06-03 RX ORDER — AMOXICILLIN 250 MG
1 CAPSULE ORAL 2 TIMES DAILY
Start: 2020-06-03

## 2020-06-03 RX ORDER — WARFARIN SODIUM 3 MG/1
3 TABLET ORAL
Status: DISCONTINUED | OUTPATIENT
Start: 2020-06-03 | End: 2020-06-03 | Stop reason: HOSPADM

## 2020-06-03 RX ORDER — OXYCODONE HYDROCHLORIDE 5 MG/1
5-10 TABLET ORAL EVERY 4 HOURS PRN
Qty: 10 TAB | Refills: 0 | Status: SHIPPED | OUTPATIENT
Start: 2020-06-03 | End: 2020-06-03

## 2020-06-03 RX ORDER — WARFARIN SODIUM 3 MG/1
TABLET ORAL
Qty: 30 TAB | Refills: 3
Start: 2020-06-03

## 2020-06-03 RX ORDER — WARFARIN SODIUM 3 MG/1
1.5 TABLET ORAL
Status: DISCONTINUED | OUTPATIENT
Start: 2020-06-04 | End: 2020-06-03 | Stop reason: HOSPADM

## 2020-06-03 RX ORDER — WARFARIN SODIUM 3 MG/1
3 TABLET ORAL
Status: DISCONTINUED | OUTPATIENT
Start: 2020-06-04 | End: 2020-06-03 | Stop reason: HOSPADM

## 2020-06-03 RX ORDER — LIDOCAINE 50 MG/G
1 PATCH TOPICAL EVERY 24 HOURS
Qty: 10 PATCH
Start: 2020-06-03

## 2020-06-03 RX ORDER — OXYCODONE HYDROCHLORIDE 5 MG/1
5-10 TABLET ORAL EVERY 4 HOURS PRN
Qty: 10 TAB | Refills: 0 | Status: SHIPPED | OUTPATIENT
Start: 2020-06-03 | End: 2020-06-06

## 2020-06-03 RX ADMIN — MORPHINE SULFATE 2 MG: 4 INJECTION INTRAVENOUS at 12:53

## 2020-06-03 RX ADMIN — MORPHINE SULFATE 2 MG: 4 INJECTION INTRAVENOUS at 03:34

## 2020-06-03 RX ADMIN — MORPHINE SULFATE 2 MG: 4 INJECTION INTRAVENOUS at 08:38

## 2020-06-03 RX ADMIN — OXYCODONE 10 MG: 5 TABLET ORAL at 02:53

## 2020-06-03 RX ADMIN — OXYCODONE 5 MG: 5 TABLET ORAL at 09:51

## 2020-06-03 RX ADMIN — PREGABALIN 75 MG: 75 CAPSULE ORAL at 05:09

## 2020-06-03 RX ADMIN — DOCUSATE SODIUM 50 MG AND SENNOSIDES 8.6 MG 2 TABLET: 8.6; 5 TABLET, FILM COATED ORAL at 05:11

## 2020-06-03 RX ADMIN — METOPROLOL TARTRATE 25 MG: 25 TABLET, FILM COATED ORAL at 05:09

## 2020-06-03 RX ADMIN — OXYCODONE 10 MG: 5 TABLET ORAL at 06:36

## 2020-06-03 RX ADMIN — SACUBITRIL AND VALSARTAN 1 TABLET: 24; 26 TABLET, FILM COATED ORAL at 05:09

## 2020-06-03 NOTE — PALLIATIVE CARE
Palliative Care follow-up  PC RN called pt's dtr Randee. Randee is still in [place] and unable to send AD. Randee states she won't be home in [place] for a day or two. Randee plans to reach out to PC RN when possible to send copy of AD for pt's chart.       Thank you for allowing Palliative Care to support this patient and family. Contact [phone] for additional assistance, change in patient status, or with any questions/concerns.

## 2020-06-03 NOTE — CARE PLAN
Problem: Safety  Goal: Will remain free from falls  Outcome: PROGRESSING AS EXPECTED   Patient educated to use call light for assistance. Fall precautions in place. Staff will assist with mobilization. Hourly rounding in place.   Problem: Knowledge Deficit  Goal: Knowledge of disease process/condition, treatment plan, diagnostic tests, and medications will improve  Outcome: PROGRESSING AS EXPECTED   Pt educated on POC for the day, disease process, upcoming tests, and medication information. Will continue to answer questions and educate pt as necessary.

## 2020-06-03 NOTE — PROGRESS NOTES
Report received from Rn. Assumed pt care Pt A&O x 4. Fall precautions in place, call light and belongings within reach, bed in lowest position. No signs of distress.

## 2020-06-03 NOTE — DISCHARGE INSTRUCTIONS
Discharge Instructions    Discharged to other by ambulance with escort. Discharged via ambulance, hospital escort: Yes.  Special equipment needed: Not Applicable    Be sure to schedule a follow-up appointment with your primary care doctor or any specialists as instructed.     Discharge Plan:   Diet Plan: Discussed  Activity Level: Discussed  Confirmed Follow up Appointment: No (Comments)(pt. is being transferred to [facility])  Confirmed Symptoms Management: Discussed  Medication Reconciliation Updated: Yes    I understand that a diet low in cholesterol, fat, and sodium is recommended for good health. Unless I have been given specific instructions below for another diet, I accept this instruction as my diet prescription.       Special Instructions:     · Is patient discharged on Warfarin / Coumadin?   Yes    You are receiving the drug warfarin. Please understand the importance of monitoring warfarin with scheduled PT/INR blood draws.  Follow-up with a call to your personal Doctor's office in 3 days to schedule a PT/INR. .    IMPORTANT: HOW TO USE THIS INFORMATION:  This is a summary and does NOT have all possible information about this product. This information does not assure that this product is safe, effective, or appropriate for you. This information is not individual medical advice and does not substitute for the advice of your health care professional. Always ask your health care professional for complete information about this product and your specific health needs.      WARFARIN - ORAL (WARF-uh-rin)      COMMON BRAND NAME(S): Coumadin      WARNING:  Warfarin can cause very serious (possibly fatal) bleeding. This is more likely to occur when you first start taking this medication or if you take too much warfarin. To decrease your risk for bleeding, your doctor or other health care provider will monitor you closely and check your lab results (INR test) to make sure you are not taking too much warfarin.  "Keep all medical and laboratory appointments. Tell your doctor right away if you notice any signs of serious bleeding. See also Side Effects section.      USES:  This medication is used to treat blood clots (such as in deep vein thrombosis-DVT or pulmonary embolus-PE) and/or to prevent new clots from forming in your body. Preventing harmful blood clots helps to reduce the risk of a stroke or heart attack. Conditions that increase your risk of developing blood clots include a certain type of irregular heart rhythm (atrial fibrillation), heart valve replacement, recent heart attack, and certain surgeries (such as hip/knee replacement). Warfarin is commonly called a \"blood thinner,\" but the more correct term is \"anticoagulant.\" It helps to keep blood flowing smoothly in your body by decreasing the amount of certain substances (clotting proteins) in your blood.      HOW TO USE:  Read the Medication Guide provided by your pharmacist before you start taking warfarin and each time you get a refill. If you have any questions, ask your doctor or pharmacist. Take this medication by mouth with or without food as directed by your doctor or other health care professional, usually once a day. It is very important to take it exactly as directed. Do not increase the dose, take it more frequently, or stop using it unless directed by your doctor. Dosage is based on your medical condition, laboratory tests (such as INR), and response to treatment. Your doctor or other health care provider will monitor you closely while you are taking this medication to determine the right dose for you. Use this medication regularly to get the most benefit from it. To help you remember, take it at the same time each day. It is important to eat a balanced, consistent diet while taking warfarin. Some foods can affect how warfarin works in your body and may affect your treatment and dose. Avoid sudden large increases or decreases in your intake of foods " high in vitamin K (such as broccoli, cauliflower, cabbage, brussels sprouts, kale, spinach, and other green leafy vegetables, liver, green tea, certain vitamin supplements). If you are trying to lose weight, check with your doctor before you try to go on a diet. Cranberry products may also affect how your warfarin works. Limit the amount of cranberry juice (16 ounces/480 milliliters a day) or other cranberry products you may drink or eat.      SIDE EFFECTS:  Nausea, loss of appetite, or stomach/abdominal pain may occur. If any of these effects persist or worsen, tell your doctor or pharmacist promptly. Remember that your doctor has prescribed this medication because he or she has judged that the benefit to you is greater than the risk of side effects. Many people using this medication do not have serious side effects. This medication can cause serious bleeding if it affects your blood clotting proteins too much (shown by unusually high INR lab results). Even if your doctor stops your medication, this risk of bleeding can continue for up to a week. Tell your doctor right away if you have any signs of serious bleeding, including: unusual pain/swelling/discomfort, unusual/easy bruising, prolonged bleeding from cuts or gums, persistent/frequent nosebleeds, unusually heavy/prolonged menstrual flow, pink/dark urine, coughing up blood, vomit that is bloody or looks like coffee grounds, severe headache, dizziness/fainting, unusual or persistent tiredness/weakness, bloody/black/tarry stools, chest pain, shortness of breath, difficulty swallowing. Tell your doctor right away if any of these unlikely but serious side effects occur: persistent nausea/vomiting, severe stomach/abdominal pain, yellowing eyes/skin. This drug rarely has caused very serious (possibly fatal) problems if its effects lead to small blood clots (usually at the beginning of treatment). This can lead to severe skin/tissue damage that may require surgery or  amputation if left untreated. Patients with certain blood conditions (protein C or S deficiency) may be at greater risk. Get medical help right away if any of these rare but serious side effects occur: painful/red/purplish patches on the skin (such as on the toe, breast, abdomen), change in the amount of urine, vision changes, confusion, slurred speech, weakness on one side of the body. A very serious allergic reaction to this drug is rare. However, get medical help right away if you notice any symptoms of a serious allergic reaction, including: rash, itching/swelling (especially of the face/tongue/throat), severe dizziness, trouble breathing. This is not a complete list of possible side effects. If you notice other effects not listed above, contact your doctor or pharmacist. In the [place] - Call your doctor for medical advice about side effects. You may report side effects to FDA at 2-037-LXU-6375. In [place] - Call your doctor for medical advice about side effects. You may report side effects to [facility] [place] at 1-786.544.2060.      PRECAUTIONS:  Before taking warfarin, tell your doctor or pharmacist if you are allergic to it; or if you have any other allergies. This product may contain inactive ingredients, which can cause allergic reactions or other problems. Talk to your pharmacist for more details. Before using this medication, tell your doctor or pharmacist your medical history, especially of: blood disorders (such as anemia, hemophilia), bleeding problems (such as bleeding of the stomach/intestines, bleeding in the brain), blood vessel disorders (such as aneurysms), recent major injury/surgery, liver disease, alcohol use, mental/mood disorders (including memory problems), frequent falls/injuries. It is important that all your doctors and dentists know that you take warfarin. Before having surgery or any medical/dental procedures, tell your doctor or dentist that you are taking this medication and about all the  products you use (including prescription drugs, nonprescription drugs, and herbal products). Avoid getting injections into the muscles. If you must have an injection into a muscle (for example, a flu shot), it should be given in the arm. This way, it will be easier to check for bleeding and/or apply pressure bandages. This medication may cause stomach bleeding. Daily use of alcohol while using this medicine will increase your risk for stomach bleeding and may also affect how this medication works. Limit or avoid alcoholic beverages. If you have not been eating well, if you have an illness or infection that causes fever, vomiting, or diarrhea for more than 2 days, or if you start using any antibiotic medications, contact your doctor or pharmacist immediately because these conditions can affect how warfarin works. This medication can cause heavy bleeding. To lower the chance of getting cut, bruised, or injured, use great caution with sharp objects like safety razors and nail cutters. Use an electric razor when shaving and a soft toothbrush when brushing your teeth. Avoid activities such as contact sports. If you fall or injure yourself, especially if you hit your head, call your doctor immediately. Your doctor may need to check you. The Food & Drug Administration has stated that generic warfarin products are interchangeable. However, consult your doctor or pharmacist before switching warfarin products. Be careful not to take more than one medication that contains warfarin unless specifically directed by the doctor or health care provider who is monitoring your warfarin treatment. Older adults may be at greater risk for bleeding while using this drug. This medication is not recommended for use during pregnancy because of serious (possibly fatal) harm to an unborn baby. Discuss the use of reliable forms of birth control with your doctor. If you become pregnant or think you may be pregnant, tell your doctor immediately.  "If you are planning pregnancy, discuss a plan for managing your condition with your doctor before you become pregnant. Your doctor may switch the type of medication you use during pregnancy. Very small amounts of this medication may pass into breast milk but is unlikely to harm a nursing infant. Consult your doctor before breast-feeding.      DRUG INTERACTIONS:  Drug interactions may change how your medications work or increase your risk for serious side effects. This document does not contain all possible drug interactions. Keep a list of all the products you use (including prescription/nonprescription drugs and herbal products) and share it with your doctor and pharmacist. Do not start, stop, or change the dosage of any medicines without your doctor's approval. Warfarin interacts with many prescription, nonprescription, vitamin, and herbal products. This includes medications that are applied to the skin or inside the vagina or rectum. The interactions with warfarin usually result in an increase or decrease in the \"blood-thinning\" (anticoagulant) effect. Your doctor or other health care professional should closely monitor you to prevent serious bleeding or clotting problems. While taking warfarin, it is very important to tell your doctor or pharmacist of any changes in medications, vitamins, or herbal products that you are taking. Some products that may interact with this drug include: capecitabine, imatinib, mifepristone. Aspirin, aspirin-like drugs (salicylates), and nonsteroidal anti-inflammatory drugs (NSAIDs such as ibuprofen, naproxen, celecoxib) may have effects similar to warfarin. These drugs may increase the risk of bleeding problems if taken during treatment with warfarin. Carefully check all prescription/nonprescription product labels (including drugs applied to the skin such as pain-relieving creams) since the products may contain NSAIDs or salicylates. Talk to your doctor about using a different " medication (such as acetaminophen) to treat pain/fever. Low-dose aspirin and related drugs (such as clopidogrel, ticlopidine) should be continued if prescribed by your doctor for specific medical reasons such as heart attack or stroke prevention. Consult your doctor or pharmacist for more details. Many herbal products interact with warfarin. Tell your doctor before taking any herbal products, especially bromelains, coenzyme Q10, cranberry, danshen, dong quai, fenugreek, garlic, ginkgo biloba, ginseng, and [place]'s wort, among others. This medication may interfere with a certain laboratory test to measure theophylline levels, possibly causing false test results. Make sure laboratory personnel and all your doctors know you use this drug.      OVERDOSE:  If overdose is suspected, contact a poison control center or emergency room immediately. [place] residents can call the [place] National Poison Hotline at 1-570.578.3811. [place] residents can call a provincial poison control center. Symptoms of overdose may include: bloody/black/tarry stools, pink/dark urine, unusual/prolonged bleeding.      NOTES:  Do not share this medication with others. Laboratory and/or medical tests (such as INR, complete blood count) must be performed periodically to monitor your progress or check for side effects. Consult your doctor for more details.      MISSED DOSE:  For the best possible benefit, do not miss any doses. If you do miss a dose and remember on the same day, take it as soon as you remember. If you remember on the next day, skip the missed dose and resume your usual dosing schedule. Do not double the dose to catch up because this could increase your risk for bleeding. Keep a record of missed doses to give to your doctor or pharmacist. Contact your doctor or pharmacist if you miss 2 or more doses in a row.      STORAGE:  Store at room temperature away from light and moisture. Do not store in the bathroom. Keep all medications away from  children and pets. Do not flush medications down the toilet or pour them into a drain unless instructed to do so. Properly discard this product when it is  or no longer needed. Consult your pharmacist or local waste disposal company for more details about how to safely discard your product.      MEDICAL ALERT:  Your condition and medication can cause complications in a medical emergency. For information about enrolling in MedicAlert, call 1-168.205.2803 ([place]) or 1-837.241.1755 ([place]).      Information last revised 2010 Copyright(c)  [employer]             Depression / Suicide Risk    As you are discharged from this [facility] facility, it is important to learn how to keep safe from harming yourself.    Recognize the warning signs:  · Abrupt changes in personality, positive or negative- including increase in energy   · Giving away possessions  · Change in eating patterns- significant weight changes-  positive or negative  · Change in sleeping patterns- unable to sleep or sleeping all the time   · Unwillingness or inability to communicate  · Depression  · Unusual sadness, discouragement and loneliness  · Talk of wanting to die  · Neglect of personal appearance   · Rebelliousness- reckless behavior  · Withdrawal from people/activities they love  · Confusion- inability to concentrate     If you or a loved one observes any of these behaviors or has concerns about self-harm, here's what you can do:  · Talk about it- your feelings and reasons for harming yourself  · Remove any means that you might use to hurt yourself (examples: pills, rope, extension cords, firearm)  · Get professional help from the community (Mental Health, Substance Abuse, psychological counseling)  · Do not be alone:Call your Safe Contact- someone whom you trust who will be there for you.  · Call your local CRISIS HOTLINE 360-6196 or 124-935-8382  · Call your local Children's Mobile Crisis Response Team [facility]  (476) 497-7435 or www.[employer]  · Call the toll free National Suicide Prevention Hotlines   · National Suicide Prevention Lifeline 117-219-DBJY (3477)  · National Hope Line Network 800-SUICIDE (546-4766)      Humerus Fracture Rehab  Ask your health care provider which exercises are safe for you. Do exercises exactly as told by your health care provider and adjust them as directed. It is normal to feel mild stretching, pulling, tightness, or discomfort as you do these exercises, but you should stop right away if you feel sudden pain or your pain gets worse. Do not begin these exercises until told by your health care provider.  Stretching and range of motion exercises  These exercises warm up your muscles and joints and improve the movement and flexibility of your arm and shoulder. These exercises can also help to relieve pain and stiffness.  Exercise A: Pendulum  1. Stand near a wall or a surface that you can hold onto for balance.  2. Bend at the waist and let your left / right arm hang straight down. Use your other arm to support you.  3. Relax your arm and shoulder muscles, and move your hips and your trunk so your left / right arm hangs freely. Your arm should swing because of the motion of your body, not because you are using your arm or shoulder muscles.  4. Keep moving so your arm swings in the following directions, as told by your health care provider:  ¨ Side to side.  ¨ Forward and backward.  ¨ In clockwise and counterclockwise circles.  5. Slowly return to the starting position.  Repeat __________ times. Complete this exercise __________ times a day.  Exercise B: Elbow extension, passive  2. Stand or sit with your left/ right elbow bent and your palm facing in, toward your body.  3. Straighten your left / right elbow as far as you can using only your arm muscles.  4. Straighten your elbow farther by gently pushing down on your forearm with your other hand. Do this until you feel a gentle stretch on  "the inside of your left/ right elbow.  5. Hold for __________ seconds.  6. Slowly return to the starting position.  Repeat __________ times. Complete this exercise __________ times a day.  Exercise C: Shoulder abduction, standing  1. Stand and hold a broomstick, a cane, or a similar object. Place your hands a little more than shoulder-width apart on the object, with your palms facing up.  2. Push the object to raise your left/ right arm out to your side and then over your head. Use your other hand to help move the stick. Stop when you feel a stretch in your shoulder, or when you reach the angle that is recommended by your health care provider.  ¨ Avoid shrugging your shoulder while you raise your arm. Keep your shoulder blade tucked down toward your spine.  3. Hold for __________ seconds.  4. Slowly return to the starting position.  Repeat __________ times. Complete this exercise __________ times a day.  Exercise D: Shoulder flexion, standing  1. Stand facing a wall. Put your left / right hand on the wall.  2. Slide your left / right hand up the wall. Stop when you feel a stretch in your shoulder, or when you reach the angle that is recommended by your health care provider.  ¨ Use your other hand to help raise your arm, if needed.  ¨ As your hand gets higher, you may need to step closer to the wall.  ¨ Avoid shrugging your shoulder as you raise your arm. Keep your shoulder blade tucked down toward your spine.  3. Hold for __________ seconds.  4. Slowly return to the starting position. Use your other arm to help, if needed.  Repeat __________ times. Complete this exercise __________ times a day.  Exercise E: External rotation  1. Sit in a stable chair without armrests, or stand.  2. Tuck a soft object, such as a folded towel or a small ball, under your left / right upper arm.  3. Hold a broomstick, a cane, or a similar object with your palms face-down, toward the floor. Bend your elbows to an \"L\" shape (90 degrees), " and keep your hands about shoulder-width apart.  4. With your healthy arm, push the stick across your body. Keep your left / right arm bent. This will rotate your left / right forearm away from your body.  5. Hold for __________ seconds.  6. Slowly return to the starting position.  Repeat __________ times. Complete this exercise __________ times a day.  Strengthening exercises  These exercises build strength and endurance in your arm and shoulder. Endurance is the ability to use your muscles for a long time, even after your muscles get tired.  Exercise F: Internal rotation, isometric  1. Stand or sit in a doorway, facing the door frame.  2. Bend your left / right elbow, and place the palm of your hand against the door frame. Only your hand should be touching the frame. Keep your upper arm at your side.  3. Gently press your hand against the door frame, as if you are trying to push your arm toward your abdomen.  ¨ Avoid shrugging your shoulder while you press your hand into the door frame. Keep your shoulder blade tucked down toward your spine.  4. Hold for __________ seconds.  5. Slowly release the tension, and relax your muscles completely before you repeat the exercise.  Repeat __________ times. Complete this exercise __________ times a day.  Exercise G: External rotation, isometric  1. Stand or sit in a doorway, facing the door frame.  2. Bend your left / right elbow, and place the back of your wrist against the door frame. Only your wrist should be touching the frame. Keep your upper arm at your side.  3. Gently press your wrist against the door frame, as if you are trying to push your arm away from your abdomen.  ¨ Avoid shrugging your shoulder while you press your hand into the door frame. Keep your shoulder blade tucked down toward your spine.  4. Hold for __________ seconds.  5. Slowly release the tension, and relax your muscles completely before you repeat the exercise.  Repeat __________ times. Complete  this exercise __________ times a day.  This information is not intended to replace advice given to you by your health care provider. Make sure you discuss any questions you have with your health care provider.  Document Released: 12/18/2006 Document Revised: 08/24/2017 Document Reviewed: 11/25/2016  [employer] Interactive Patient Education © 2017 [employer]                    Pain Management after Surgery, Injury or Illness    What should I expect if I have pain?  • Some pain may be normal.  • It is important to know that it may not be possible to completely eliminate your pain.  • Our goal is to help you to manage your pain so that you can get back to your normal routine as soon as possible.  • We will work together to create a plan to manage your pain and track the progress of the plan.  • If you have questions about your care, please tell your nurse or provider.  How is my pain measured?  • Your pain will be measured on a scale of 0 to 10.  • The pain rating scale will help you to score your pain based on your ability to function with that pain.  • For example, a score of:  • 0 = No pain  • 5 = Pain interrupts some activities  • 10 = Pain is as bad as it can be, nothing else matters  • Remember, some pain is expected following illness, injury or surgery.  How will my pain be treated?  • You may be offered medication to treat your pain.  • You can also use non-medicine treatments to help manage your pain.  • If you have severe, uncontrolled pain, you may be prescribed narcotics, also known as opioids.  • You have the right to learn about your options and work with your care team to find the best treatment to manage your pain.  Non-Opioid Medicines  • Many effective medicines do not need a prescription. Examples include:  o Acetaminophen, which you may know as Tylenol®  o Ibuprofen, which you may know as Advil® or Motrin®  o Aspirin  • Other low risk medicines require a prescription and are helpful for treating  pain. Examples include:  o Celecoxib, which you may know as Celebrex®  Alternative Therapies  • Alternative therapies can be very helpful in managing pain. You can try:  • Ice or heat packs as recommended by your care team.  • Massage, relaxation techniques or meditation.  • Changing positions in bed.  • Watching TV or listening to music.  Opioids, also known as Narcotics  • Opioids should only be used to treat severe pain that cannot be controlled by other methods.  • Opioids have been shown to increase your risk of complications.  • Opioids are highly addictive.  • Opioids should only be used in the lowest effective dose, for a limited amount of time.  • Opioids require a prescription. Some examples include:  o Tramadol, known as Ultram®.  o Hydrocodone with acetaminophen, known as Lortab®, Vicodin®, Norco®.  o Oxycodone with acetaminophen, known as Percocet®, or Roxicet®.  o Oxycodone, known as OxyContin®.  o Morphine, known as MS Contin®.  What will happen after I go home from the hospital?  • Your care team will discuss your ongoing care plan with you before you leave.  • You will be given detailed instructions for any medicine and follow up care.  • You may be given a prescription for medicines to take when you go home.  • Be sure to tell your care team if you have concerns about caring for yourself at home. Common concerns may include stairs, or living alone.  How should I manage pain when I go home?  • Always use non-opioid and non-medicine options first.  • Take non-opioid medicine regularly, as instructed by your care team.  • Avoid doing things that make your pain worse like heavy lifting or straining.  • Use opioids only to treat severe pain that is not controlled by other methods.  • Always follow the instructions of your care team.  • Always take the lowest effective dose.  • Do not take more than prescribed.  • Do not mix with sleeping pills or alcohol.  • Stop taking opioids when the pain can be  managed by other methods listed above.  • You may also be referred to a pain specialist when needed.    IMPORTANT INFORMATION:  Side effects of Opiates may include:  • Sleepiness  • Dizziness  • Nausea and/or vomiting  • Constipation  • Decreased breathing  • Addiction  • Overdose  • Death    Opiate dependency and addiction risk:  • The risk of dependency increases after 3 days of continuous use.  • There are many local resources to help with dependency issues.  • Opiate dependency can develop easily. Don’t feel ashamed.  • Speak to your care team if you have concerns.  General medicine safety:  • Keep all medicines in a safe place, out of sight so they cannot be taken by someone else.  • Keep out of reach of children.  • Never mix opioids with sleeping pills, over the counter sleep aids or alcohol.  • Do not drive while taking opioids.  • Be careful at home when cooking, bathing, showering or using stairs.  You may be more likely to hurt yourself or fall.  • For anyone taking opioids, watch for excessive sleepiness or decreased breathing as a sign of overdose. Try to arouse the person if you are concerned. Call 911 if you are unable to awaken them OR if their breathing is shallow, slow, or irregular.  Proper medicine disposal:  • Empty liquid medicine from containers, open capsules or crush tablets and mix with maria luz litter, dirt or old coffee grounds. Place the mixture into a sealed bag or container and throw it in the trash.  • Take medicines to a local drug takeback center, for a list visit: https://apps.deadiversion[employer]gov/pubdispsearch/spring/main?execution=e1s2  • Use a home drug disposal pouch.  • Contact your local pharmacy for help.

## 2020-06-03 NOTE — PROGRESS NOTES
Inpatient Anticoagulation Service Note    Date: 6/3/2020    Reason for Anticoagulation: Atrial Fibrillation   Target INR: 2.0 to 3.0  [id number] VASc Score: 5  HAS-BLED Score: 1   Hemoglobin Value: 13.2  Hematocrit Value: 40.3  Lab Platelet Value: 226    INR from last 7 days     Date/Time INR Value    06/03/20 0407  (!) 1.96    06/02/20 0319  (!) 1.8    06/01/20 1234  (!) 1.76    05/31/20 1334  (!) 1.99        Dose from last 7 days     Date/Time Dose (mg)    06/03/20 0912  3    06/02/20 1120  5    06/01/20 1416  3    05/31/20 2056  3        Average Dose (mg): (MWF 1.5mg and all other days 3mg)  Significant Interactions: Not Applicable  Bridge Therapy: No     Reversal Agent Administered: Not Applicable  Comments: Home warfarin for afib.  INR increasing after multiple days of increased doses compared to reported home regimen.  No DDI. No s/sx of bleeding per chart review; H/H stable.  Will give 3mg again tonight and then resume home regimen thereafter.    Plan:  Warfarin 3mg. INR in AM.  Education Material Provided?: No(chronic)  Pharmacist suggested discharge dosing: Warfarin MWF 1.5mg and all other days 3mg with follow up within 5 days of discharge.     Ralph Santiago, PharmD

## 2020-06-03 NOTE — PROGRESS NOTES
1615: Patient refusing for this RN to take out her osorio; Anastasia RN was with this RN educating the patient on the risks for keeping a osorio in. Patients granddaughter at bedside. Anastasia, and this RN educated for 10 mins, patient still refusing.

## 2020-06-03 NOTE — PROGRESS NOTES
Bedside report received from nurse. Assumed care of pt. Pt resting comfortably in bed. A/Ox4, VSS,  All needs met. POC reviewed and white board updated. Tele box on. Call light in reach. Bed locked in lowest position with 2 upper bed rails up.

## 2020-06-03 NOTE — DISCHARGE PLANNING
Received Transport Form @ 1120  Spoke to [place] @ [facility]    Transport is scheduled for 6/3 @????  going to [facility].    [place] will call [facility] back with availability.    1245  Per RN MARA HERRERA, the pt's family has chosen to pick her up themselves.    [place] at [facility] notified of cancellation.

## 2020-06-03 NOTE — PROGRESS NOTES
"RE[facility] transportation to [place] not applicable for pt. Due to cost. Family opted in to  patient. Pt. Pre medicated for Transportation downstairs by wheelchair. Once at family vehicle pt. Was verbally aggressive yelling profanities and refusing to get into the vehicle. Pt. Daughter stated \"Just get her in the car, this is the only way she'll get to the hospital\". Daughter explained importance of getting into the car. Two Male RN's lifted pt. Safely into vehicle. Daughter given POLST, prescriptions, and COBRA. Daughter will give to Carlyle.     Carlyle RN notified of patient departure.   "

## 2020-06-03 NOTE — DISCHARGE SUMMARY
Discharge Summary    CHIEF COMPLAINT ON ADMISSION  No chief complaint on file.      Reason for Admission  Right Shoulder Fracture     CODE STATUS  DNAR/DNI    HPI & HOSPITAL COURSE  This is a 84 y.o. female here with PMH afib, DM, peripheral neuropathy, chronic pain, CHF, CAD who presented with fall and right shoulder pain. She was found with right proximal humeral fracture without displacement. Dr. Arrieta with ortho was consulted, recommended non-op management, sling immobilizer and f/u with ÁNGEL in 2 weeks. PTOT recommended SNF. She had difficult to control pain from her fracture along with chronic pain and opioid use and is followed by pain clinic in [place]. She will be prescribed on higher dose of oxycodone to be management by SNF until she can wean. TTE was done which showed improvement of her prior heart failure with EF 55%. She was in afib, she was changed from coreg to metoprolol for better rate control and continued on coumadin. She will need an INR check tomorrow. INR on 6/3/20 was 1.96.    Therefore, she is discharged in good and stable condition to skilled nursing facility. Daughter was not able to pay for transport and will transport patient in her own vehicle.    The patient met 2-midnight criteria for an inpatient stay at the time of discharge.      FOLLOW UP ITEMS POST DISCHARGE  INR check tomorrow for coumadin dosing.  F/u with ortho in 2 weeks    DISCHARGE DIAGNOSES  Principal Problem:    Closed fracture of proximal end of humerus POA: Yes  Active Problems:    Atrial fibrillation (HCC) POA: Yes    Chronic systolic heart failure (HCC) POA: Yes    Hyponatremia POA: Yes    Type 2 diabetes mellitus with diabetic polyneuropathy, without long-term current use of insulin (HCC) POA: Yes  Resolved Problems:    * No resolved hospital problems. *      FOLLOW UP  No follow-up provider specified.    MEDICATIONS ON DISCHARGE     Medication List      START taking these medications      Instructions   lidocaine 5 %  Ptch  Commonly known as:  LIDODERM   Apply 1 Patch to skin as directed every 24 hours.  Dose:  1 Patch     metoprolol 25 MG Tabs  Commonly known as:  LOPRESSOR   Take 1 Tab by mouth 2 Times a Day.  Dose:  25 mg        CHANGE how you take these medications      Instructions   oxyCODONE immediate-release 5 MG Tabs  What changed:  how much to take  Commonly known as:  ROXICODONE   Take 1-2 Tabs by mouth every four hours as needed for Severe Pain for up to 3 days.  Dose:  5-10 mg     pregabalin 75 MG Caps  What changed:    · how much to take  · when to take this  Commonly known as:  LYRICA   Take 1 Cap by mouth 2 Times a Day.  Dose:  75 mg     senna-docusate 8.6-50 MG Tabs  What changed:  when to take this  Commonly known as:  PERICOLACE or SENOKOT S   Take 1 Tab by mouth 2 times a day.  Dose:  1 Tab     warfarin 3 MG Tabs  What changed:    · how much to take  · how to take this  · when to take this  · additional instructions  Commonly known as:  COUMADIN   Take 1.5mg once on Monday, Wednesday, Fridays. Take 3mg once on all other days        CONTINUE taking these medications      Instructions   alendronate 70 MG Tabs  Commonly known as:  FOSAMAX   Take 70 mg by mouth every 7 days. EVERY SUNDAY  Dose:  70 mg     digoxin 125 MCG Tabs  Commonly known as:  LANOXIN   Take 1 Tab by mouth every day at 6 PM.  Dose:  125 mcg     Entresto 24-26 MG Tabs tablet  Generic drug:  sacubitril-valsartan   Take 1 Tab by mouth every day.  Dose:  1 Tab        STOP taking these medications    bumetanide 1 MG Tabs  Commonly known as:  BUMEX     carvedilol 3.125 MG Tabs  Commonly known as:  COREG     potassium chloride SA 10 MEQ Tbcr  Commonly known as:  K-DUR            Allergies  No Known Allergies    DIET  Orders Placed This Encounter   Procedures   • Diet Order Cardiac     Standing Status:   Standing     Number of Occurrences:   1     Order Specific Question:   Diet:     Answer:   Cardiac [6]       ACTIVITY  Right arm in sling,  NWB    LINES, DRAINS, AND WOUNDS  This is an automated list. Peripheral IVs will be removed prior to discharge.  Peripheral IV 05/31/20 24 G Hand (Active)   Site Assessment Clean;Dry;Intact 06/03/20 0000   Dressing Type Transparent 06/03/20 0000   Line Status Flushed;Saline locked 06/03/20 0000   Dressing Status Clean;Intact 06/03/20 0000   Dressing Intervention N/A 06/03/20 0000   Infiltration Grading ([facility], [facility]) 0 06/03/20 0000   Phlebitis Scale ([facility] Only) 0 06/03/20 0000       Peripheral IV 05/31/20 18 G Right Upper arm (Active)   Site Assessment Red 06/03/20 0000   Dressing Type Occlusive 06/03/20 0000   Line Status Flushed;Saline locked 06/03/20 0000   Dressing Status Old drainage;Dry;Intact 06/03/20 0000   Dressing Intervention N/A 06/03/20 0000   Infiltration Grading ([facility], [facility]) 0 06/03/20 0000   Phlebitis Scale ([facility] Only) 0 06/03/20 0000     Urethral Catheter Latex 16 Fr. (Active)   Site Assessment Clean;Skin intact 06/02/20 2000   Collection Container Standard drainage bag 06/02/20 2000   Urinary Catheter Care Drainage Bag Not Overfilled;Drainage Tubing Properly Secured;Drainage Bag Below Bladder Level and Not on Floor 06/02/20 2000   Securement Method Securing device (Describe) 06/02/20 2000   Output (mL) 450 mL 06/03/20 0418         Peripheral IV 05/31/20 24 G Hand (Active)   Site Assessment Clean;Dry;Intact 06/03/20 0000   Dressing Type Transparent 06/03/20 0000   Line Status Flushed;Saline locked 06/03/20 0000   Dressing Status Clean;Intact 06/03/20 0000   Dressing Intervention N/A 06/03/20 0000   Infiltration Grading ([facility]) 0 06/03/20 0000   Phlebitis Scale ([facility] Only) 0 06/03/20 0000       Peripheral IV 05/31/20 18 G Right Upper arm (Active)   Site Assessment Red 06/03/20 0000   Dressing Type Occlusive 06/03/20 0000   Line Status Flushed;Saline locked 06/03/20 0000   Dressing Status Old drainage;Dry;Intact 06/03/20 0000   Dressing Intervention N/A 06/03/20 0000   Infiltration  Grading ([facility]) 0 06/03/20 0000   Phlebitis Scale ([facility] Only) 0 06/03/20 0000           Urethral Catheter Latex 16 Fr. (Active)   Site Assessment Clean;Skin intact 06/02/20 2000   Collection Container Standard drainage bag 06/02/20 2000   Urinary Catheter Care Drainage Bag Not Overfilled;Drainage Tubing Properly Secured;Drainage Bag Below Bladder Level and Not on Floor 06/02/20 2000   Securement Method Securing device (Describe) 06/02/20 2000   Output (mL) 450 mL 06/03/20 0418        MENTAL STATUS ON TRANSFER  Level of Consciousness: Alert  Orientation : Oriented x 4  Speech: Speech Clear    CONSULTATIONS  Ortho    PROCEDURES  CT-SHOULDER W/O PLUS RECONS RIGHT   Final Result      1.  Right proximal humeral fracture involving the surgical neck and greater tuberosity      2.  No significant angulation or displacement      DX-SHOULDER 2+ RIGHT   Final Result      1.  Right proximal humeral metaphyseal fracture      2.  No other finding      [id number] VIEW   Final Result      Mild small and large bowel dilation that is most likely adynamic ileus      EC-ECHOCARDIOGRAM LTD W/O CONT   Final Result      OUTSIDE IMAGES-DX UPPER EXTREMITY, RIGHT   Final Result      OUTSIDE IMAGES-DX UPPER EXTREMITY, RIGHT   Final Result      OUTSIDE IMAGES-DX LOWER EXTREMITY, LEFT   Final Result            LABORATORY  Lab Results   Component Value Date    SODIUM 134 (L) 06/02/2020    POTASSIUM 3.9 06/02/2020    CHLORIDE 97 06/02/2020    CO2 25 06/02/2020    GLUCOSE 109 (H) 06/02/2020    BUN 11 06/02/2020    CREATININE 0.42 (L) 06/02/2020        Lab Results   Component Value Date    WBC 10.9 (H) 06/02/2020    HEMOGLOBIN 13.2 06/02/2020    HEMATOCRIT 40.3 06/02/2020    PLATELETCT 226 06/02/2020        Total time of the discharge process exceeds 32 minutes.

## 2020-06-05 LAB — EKG IMPRESSION: NORMAL
